# Patient Record
Sex: FEMALE | Race: OTHER | HISPANIC OR LATINO | URBAN - METROPOLITAN AREA
[De-identification: names, ages, dates, MRNs, and addresses within clinical notes are randomized per-mention and may not be internally consistent; named-entity substitution may affect disease eponyms.]

---

## 2017-08-12 ENCOUNTER — EMERGENCY (EMERGENCY)
Facility: HOSPITAL | Age: 47
LOS: 1 days | End: 2017-08-12
Payer: MEDICAID

## 2017-08-12 PROCEDURE — 99053 MED SERV 10PM-8AM 24 HR FAC: CPT

## 2017-08-12 PROCEDURE — 99284 EMERGENCY DEPT VISIT MOD MDM: CPT | Mod: 25

## 2017-08-13 PROCEDURE — 73560 X-RAY EXAM OF KNEE 1 OR 2: CPT | Mod: 26,LT

## 2017-08-13 PROCEDURE — 71010: CPT | Mod: 26

## 2017-11-26 ENCOUNTER — EMERGENCY (EMERGENCY)
Facility: HOSPITAL | Age: 47
LOS: 1 days | End: 2017-11-26
Payer: MEDICAID

## 2017-11-26 PROCEDURE — 99285 EMERGENCY DEPT VISIT HI MDM: CPT | Mod: 25

## 2017-11-27 PROCEDURE — 74177 CT ABD & PELVIS W/CONTRAST: CPT | Mod: 26

## 2019-01-14 ENCOUNTER — EMERGENCY (EMERGENCY)
Facility: HOSPITAL | Age: 49
LOS: 1 days | End: 2019-01-14
Payer: MEDICAID

## 2019-01-14 PROCEDURE — 71046 X-RAY EXAM CHEST 2 VIEWS: CPT | Mod: 26

## 2019-01-14 PROCEDURE — 99284 EMERGENCY DEPT VISIT MOD MDM: CPT

## 2019-12-12 ENCOUNTER — OUTPATIENT (OUTPATIENT)
Dept: OUTPATIENT SERVICES | Facility: HOSPITAL | Age: 49
LOS: 1 days | End: 2019-12-12

## 2019-12-12 ENCOUNTER — INPATIENT (INPATIENT)
Facility: HOSPITAL | Age: 49
LOS: 4 days | Discharge: HOME | End: 2019-12-17
Payer: MEDICAID

## 2019-12-12 PROCEDURE — 99285 EMERGENCY DEPT VISIT HI MDM: CPT

## 2019-12-12 PROCEDURE — 74177 CT ABD & PELVIS W/CONTRAST: CPT | Mod: 26

## 2019-12-13 ENCOUNTER — OUTPATIENT (OUTPATIENT)
Dept: OUTPATIENT SERVICES | Facility: HOSPITAL | Age: 49
LOS: 1 days | End: 2019-12-13

## 2019-12-14 ENCOUNTER — OUTPATIENT (OUTPATIENT)
Dept: OUTPATIENT SERVICES | Facility: HOSPITAL | Age: 49
LOS: 1 days | End: 2019-12-14

## 2019-12-15 ENCOUNTER — OUTPATIENT (OUTPATIENT)
Dept: OUTPATIENT SERVICES | Facility: HOSPITAL | Age: 49
LOS: 1 days | End: 2019-12-15

## 2019-12-16 ENCOUNTER — OUTPATIENT (OUTPATIENT)
Dept: OUTPATIENT SERVICES | Facility: HOSPITAL | Age: 49
LOS: 1 days | End: 2019-12-16

## 2019-12-17 ENCOUNTER — OUTPATIENT (OUTPATIENT)
Dept: OUTPATIENT SERVICES | Facility: HOSPITAL | Age: 49
LOS: 1 days | End: 2019-12-17

## 2020-04-29 ENCOUNTER — EMERGENCY (EMERGENCY)
Facility: HOSPITAL | Age: 50
LOS: 1 days | End: 2020-04-29
Admitting: EMERGENCY MEDICINE
Payer: MEDICAID

## 2020-04-29 PROCEDURE — 70450 CT HEAD/BRAIN W/O DYE: CPT | Mod: 26

## 2020-04-29 PROCEDURE — 99285 EMERGENCY DEPT VISIT HI MDM: CPT

## 2020-04-29 PROCEDURE — 71045 X-RAY EXAM CHEST 1 VIEW: CPT | Mod: 26

## 2020-07-09 ENCOUNTER — EMERGENCY (EMERGENCY)
Facility: HOSPITAL | Age: 50
LOS: 1 days | End: 2020-07-09
Admitting: EMERGENCY MEDICINE
Payer: MEDICAID

## 2020-07-09 PROCEDURE — 71045 X-RAY EXAM CHEST 1 VIEW: CPT | Mod: 26

## 2020-07-09 PROCEDURE — 99285 EMERGENCY DEPT VISIT HI MDM: CPT

## 2020-11-27 ENCOUNTER — EMERGENCY (EMERGENCY)
Facility: HOSPITAL | Age: 50
LOS: 1 days | End: 2020-11-27
Payer: MEDICAID

## 2020-11-27 ENCOUNTER — OUTPATIENT (OUTPATIENT)
Dept: OUTPATIENT SERVICES | Facility: HOSPITAL | Age: 50
LOS: 1 days | End: 2020-11-27

## 2020-11-27 PROCEDURE — 73562 X-RAY EXAM OF KNEE 3: CPT | Mod: 26,RT

## 2020-11-27 PROCEDURE — 73030 X-RAY EXAM OF SHOULDER: CPT | Mod: 26,LT

## 2020-11-27 PROCEDURE — 93010 ELECTROCARDIOGRAM REPORT: CPT

## 2020-11-27 PROCEDURE — 99284 EMERGENCY DEPT VISIT MOD MDM: CPT

## 2020-11-27 PROCEDURE — 71046 X-RAY EXAM CHEST 2 VIEWS: CPT | Mod: 26

## 2020-11-28 PROCEDURE — 93010 ELECTROCARDIOGRAM REPORT: CPT

## 2020-11-28 PROCEDURE — 93971 EXTREMITY STUDY: CPT | Mod: 26,LT

## 2020-11-28 PROCEDURE — 73221 MRI JOINT UPR EXTREM W/O DYE: CPT | Mod: 26,LT

## 2020-11-28 PROCEDURE — 99283 EMERGENCY DEPT VISIT LOW MDM: CPT

## 2020-11-29 ENCOUNTER — OUTPATIENT (OUTPATIENT)
Dept: OUTPATIENT SERVICES | Facility: HOSPITAL | Age: 50
LOS: 1 days | End: 2020-11-29

## 2020-11-30 ENCOUNTER — OUTPATIENT (OUTPATIENT)
Dept: OUTPATIENT SERVICES | Facility: HOSPITAL | Age: 50
LOS: 1 days | End: 2020-11-30

## 2020-11-30 PROBLEM — Z00.00 ENCOUNTER FOR PREVENTIVE HEALTH EXAMINATION: Status: ACTIVE | Noted: 2020-11-30

## 2021-01-11 ENCOUNTER — APPOINTMENT (OUTPATIENT)
Dept: RADIOLOGY | Facility: CLINIC | Age: 51
End: 2021-01-11
Payer: COMMERCIAL

## 2021-01-11 PROCEDURE — 71100 X-RAY EXAM RIBS UNI 2 VIEWS: CPT | Mod: RT

## 2021-01-11 PROCEDURE — 73010 X-RAY EXAM OF SHOULDER BLADE: CPT | Mod: RT

## 2023-05-01 ENCOUNTER — NON-APPOINTMENT (OUTPATIENT)
Age: 53
End: 2023-05-01

## 2023-05-01 ENCOUNTER — APPOINTMENT (OUTPATIENT)
Dept: COLORECTAL SURGERY | Facility: CLINIC | Age: 53
End: 2023-05-01
Payer: MEDICAID

## 2023-05-01 PROCEDURE — 99204 OFFICE O/P NEW MOD 45 MIN: CPT

## 2023-05-01 RX ORDER — NEOMYCIN SULFATE 500 MG/1
500 TABLET ORAL
Qty: 6 | Refills: 0 | Status: ACTIVE | COMMUNITY
Start: 2023-05-01 | End: 1900-01-01

## 2023-05-01 NOTE — ASSESSMENT
[FreeTextEntry1] : Ms. Avery presents to the office to discuss laparoscopic colostomy reversal after Lap Hartmanns in 1/5/23 for perf diverticulitis. She had a recent negative colonoscopy on 4/5/23. 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 Duration of procedure, length of hospital stay, period of convalescence including limitations of activity were all reviewed.   All questions were answered to her satisfaction. She will schedule for [facility] at end of June.\par

## 2023-05-01 NOTE — PHYSICAL EXAM
[No Rash or Lesion] : No rash or lesion [Alert] : alert [Oriented to Person] : oriented to person [Oriented to Place] : oriented to place [Oriented to Time] : oriented to time [Calm] : calm [de-identified] : colostomy with parastomal hernia present, soft, NT, no apparent distention [de-identified] : No apparent distress [de-identified] : Normocephalic atraumatic [de-identified] : Moving all extremities x4

## 2023-05-01 NOTE — HISTORY OF PRESENT ILLNESS
[FreeTextEntry1] : Ms. Avery presents to the office for consultation for colostomy reversal.  She underwent laparoscopic Zacarias's procedure for perforated diverticulitis on 1/5/2023 with Dr. Sharita Khan at [place].  On 4/5/2023, she underwent colonoscopy via colostomy without any concerning findings.  Unfortunately, [place] no longer honors her insurance, and she is now here to discuss colostomy reversal.

## 2023-05-01 NOTE — CONSULT LETTER
[Dear  ___] : Dear  [unfilled], [Consult Letter:] : I had the pleasure of evaluating your patient, [unfilled]. [Please see my note below.] : Please see my note below. [Consult Closing:] : Thank you very much for allowing me to participate in the care of this patient.  If you have any questions, please do not hesitate to contact me. [Sincerely,] : Sincerely, [FreeTextEntry3] : Anne Abernathy MD\par

## 2023-05-02 VITALS
HEART RATE: 114 BPM | WEIGHT: 175 LBS | SYSTOLIC BLOOD PRESSURE: 102 MMHG | HEIGHT: 60 IN | DIASTOLIC BLOOD PRESSURE: 70 MMHG | BODY MASS INDEX: 34.36 KG/M2

## 2023-06-06 ENCOUNTER — OUTPATIENT (OUTPATIENT)
Dept: OUTPATIENT SERVICES | Facility: HOSPITAL | Age: 53
LOS: 1 days | End: 2023-06-06
Payer: MEDICAID

## 2023-06-06 VITALS
RESPIRATION RATE: 17 BRPM | DIASTOLIC BLOOD PRESSURE: 70 MMHG | TEMPERATURE: 96 F | OXYGEN SATURATION: 98 % | HEART RATE: 91 BPM | SYSTOLIC BLOOD PRESSURE: 120 MMHG | HEIGHT: 55 IN | WEIGHT: 130.73 LBS

## 2023-06-06 DIAGNOSIS — Z13.89 ENCOUNTER FOR SCREENING FOR OTHER DISORDER: ICD-10-CM

## 2023-06-06 DIAGNOSIS — Z29.9 ENCOUNTER FOR PROPHYLACTIC MEASURES, UNSPECIFIED: ICD-10-CM

## 2023-06-06 DIAGNOSIS — Z98.890 OTHER SPECIFIED POSTPROCEDURAL STATES: Chronic | ICD-10-CM

## 2023-06-06 DIAGNOSIS — Z01.818 ENCOUNTER FOR OTHER PREPROCEDURAL EXAMINATION: ICD-10-CM

## 2023-06-06 DIAGNOSIS — I10 ESSENTIAL (PRIMARY) HYPERTENSION: ICD-10-CM

## 2023-06-06 DIAGNOSIS — Z43.3 ENCOUNTER FOR ATTENTION TO COLOSTOMY: ICD-10-CM

## 2023-06-06 DIAGNOSIS — E11.9 TYPE 2 DIABETES MELLITUS WITHOUT COMPLICATIONS: ICD-10-CM

## 2023-06-06 DIAGNOSIS — Z93.3 COLOSTOMY STATUS: Chronic | ICD-10-CM

## 2023-06-06 LAB
A1C WITH ESTIMATED AVERAGE GLUCOSE RESULT: 9.1 % — HIGH (ref 4–5.6)
ANION GAP SERPL CALC-SCNC: 14 MMOL/L — SIGNIFICANT CHANGE UP (ref 5–17)
APTT BLD: 30.1 SEC — SIGNIFICANT CHANGE UP (ref 27.5–35.5)
BASOPHILS # BLD AUTO: 0.04 K/UL — SIGNIFICANT CHANGE UP (ref 0–0.2)
BASOPHILS NFR BLD AUTO: 0.3 % — SIGNIFICANT CHANGE UP (ref 0–2)
BLD GP AB SCN SERPL QL: SIGNIFICANT CHANGE UP
BUN SERPL-MCNC: 8.6 MG/DL — SIGNIFICANT CHANGE UP (ref 8–20)
CALCIUM SERPL-MCNC: 9.3 MG/DL — SIGNIFICANT CHANGE UP (ref 8.4–10.5)
CHLORIDE SERPL-SCNC: 100 MMOL/L — SIGNIFICANT CHANGE UP (ref 96–108)
CO2 SERPL-SCNC: 24 MMOL/L — SIGNIFICANT CHANGE UP (ref 22–29)
CREAT SERPL-MCNC: 0.42 MG/DL — LOW (ref 0.5–1.3)
EGFR: 118 ML/MIN/1.73M2 — SIGNIFICANT CHANGE UP
EOSINOPHIL # BLD AUTO: 0.03 K/UL — SIGNIFICANT CHANGE UP (ref 0–0.5)
EOSINOPHIL NFR BLD AUTO: 0.2 % — SIGNIFICANT CHANGE UP (ref 0–6)
ESTIMATED AVERAGE GLUCOSE: 214 MG/DL — HIGH (ref 68–114)
GLUCOSE SERPL-MCNC: 229 MG/DL — HIGH (ref 70–99)
HCT VFR BLD CALC: 35.2 % — SIGNIFICANT CHANGE UP (ref 34.5–45)
HGB BLD-MCNC: 11.4 G/DL — LOW (ref 11.5–15.5)
IMM GRANULOCYTES NFR BLD AUTO: 0.8 % — SIGNIFICANT CHANGE UP (ref 0–0.9)
INR BLD: 1.02 RATIO — SIGNIFICANT CHANGE UP (ref 0.88–1.16)
LYMPHOCYTES # BLD AUTO: 23 % — SIGNIFICANT CHANGE UP (ref 13–44)
LYMPHOCYTES # BLD AUTO: 3.48 K/UL — HIGH (ref 1–3.3)
MCHC RBC-ENTMCNC: 28.9 PG — SIGNIFICANT CHANGE UP (ref 27–34)
MCHC RBC-ENTMCNC: 32.4 GM/DL — SIGNIFICANT CHANGE UP (ref 32–36)
MCV RBC AUTO: 89.3 FL — SIGNIFICANT CHANGE UP (ref 80–100)
MONOCYTES # BLD AUTO: 0.76 K/UL — SIGNIFICANT CHANGE UP (ref 0–0.9)
MONOCYTES NFR BLD AUTO: 5 % — SIGNIFICANT CHANGE UP (ref 2–14)
NEUTROPHILS # BLD AUTO: 10.72 K/UL — HIGH (ref 1.8–7.4)
NEUTROPHILS NFR BLD AUTO: 70.7 % — SIGNIFICANT CHANGE UP (ref 43–77)
PLATELET # BLD AUTO: 454 K/UL — HIGH (ref 150–400)
POTASSIUM SERPL-MCNC: 4 MMOL/L — SIGNIFICANT CHANGE UP (ref 3.5–5.3)
POTASSIUM SERPL-SCNC: 4 MMOL/L — SIGNIFICANT CHANGE UP (ref 3.5–5.3)
PROTHROM AB SERPL-ACNC: 11.8 SEC — SIGNIFICANT CHANGE UP (ref 10.5–13.4)
RBC # BLD: 3.94 M/UL — SIGNIFICANT CHANGE UP (ref 3.8–5.2)
RBC # FLD: 14 % — SIGNIFICANT CHANGE UP (ref 10.3–14.5)
SODIUM SERPL-SCNC: 138 MMOL/L — SIGNIFICANT CHANGE UP (ref 135–145)
WBC # BLD: 15.15 K/UL — HIGH (ref 3.8–10.5)
WBC # FLD AUTO: 15.15 K/UL — HIGH (ref 3.8–10.5)

## 2023-06-06 PROCEDURE — 93010 ELECTROCARDIOGRAM REPORT: CPT

## 2023-06-06 PROCEDURE — G0463: CPT

## 2023-06-06 PROCEDURE — 93005 ELECTROCARDIOGRAM TRACING: CPT

## 2023-06-06 NOTE — H&P PST ADULT - ASSESSMENT
53 y/o female with PMH of HLD, DM II, HTN, diverticulitis, anemia, IBS, fatty liver scheduled for laparoscopic reversal and related procedures on 2023      -Medical evaluation pending  - Patient educated on ERP protocol (written/verbal)- verbalized understanding  -Educated on NSAIDS, multivitamins and herbals that increase the risk of bleeding and need to be stopped 5 days before procedure  -Educated on infection prevention  -Tylenol can be taken 5 days before surgery if needed for pain  -Stop metformin the morning of surgery  -Will continue all other medications as prescribed  -Verbalized understanding of all instructions.    OPIOID RISK TOOL    SHARON EACH BOX THAT APPLIES AND ADD TOTALS AT THE END    FAMILY HISTORY OF SUBSTANCE ABUSE                 FEMALE         MALE                                                Alcohol                             [  ]1 pt          [  ]3pts                                               Illegal Durgs                     [  ]2 pts        [  ]3pts                                               Rx Drugs                           [  ]4 pts        [  ]4 pts    PERSONAL HISTORY OF SUBSTANCE ABUSE                                                                                          Alcohol                             [  ]3 pts       [  ]3 pts                                               Illegal Drugs                     [  ]4 pts        [  ]4 pts                                               Rx Drugs                           [  ]5 pts        [  ]5 pts    AGE BETWEEN 16-45 YEARS                                      [  ]1 pt         [  ]1 pt    HISTORY OF PREADOLESCENT   SEXUAL ABUSE                                                             [  ]3 pts        [  ]0pts    PSYCHOLOGICAL DISEASE                     ADD, OCD, Bipolar, Schizophrenia        [  ]2 pts         [  ]2 pts                      Depression                                               [  ]1 pt           [  ]1 pt           SCORING TOTAL   (add numbers and type here)              (0)                                     A score of 3 or lower indicated LOW risk for future opioid abuse  A score of 4 to 7 indicated moderate risk for future opioid abuse  A score of 8 or higher indicates a high risk for opioid abuse    CAPRINI SCORE [CLOT]    AGE RELATED RISK FACTORS                                                       MOBILITY RELATED FACTORS  [ x] Age 41-60 years                                            (1 Point)                  [ ] Bed rest                                                        (1 Point)  [ ] Age: 61-74 years                                           (2 Points)                 [ ] Plaster cast                                                   (2 Points)  [ ] Age= 75 years                                              (3 Points)                 [ ] Bed bound for more than 72 hours                 (2 Points)    DISEASE RELATED RISK FACTORS                                               GENDER SPECIFIC FACTORS  [ ] Edema in the lower extremities                       (1 Point)                  [ ] Pregnancy                                                     (1 Point)  [ ] Varicose veins                                               (1 Point)                  [ ] Post-partum < 6 weeks                                   (1 Point)             [x ] BMI > 25 Kg/m2                                            (1 Point)                  [ ] Hormonal therapy  or oral contraception          (1 Point)                 [ ] Sepsis (in the previous month)                        (1 Point)                  [ ] History of pregnancy complications                 (1 point)  [ ] Pneumonia or serious lung disease                                               [ ] Unexplained or recurrent                     (1 Point)           (in the previous month)                               (1 Point)  [ ] Abnormal pulmonary function test                     (1 Point)                 SURGERY RELATED RISK FACTORS  [ ] Acute myocardial infarction                              (1 Point)                 [ ]  Section                                             (1 Point)  [ ] Congestive heart failure (in the previous month)  (1 Point)               [ ] Minor surgery                                                  (1 Point)   [ ] Inflammatory bowel disease                             (1 Point)                 [ ] Arthroscopic surgery                                        (2 Points)  [ ] Central venous access                                      (2 Points)                [ x] General surgery lasting more than 45 minutes   (2 Points)       [ ] Stroke (in the previous month)                          (5 Points)               [ ] Elective arthroplasty                                         (5 Points)                                                                                                                                               HEMATOLOGY RELATED FACTORS                                                 TRAUMA RELATED RISK FACTORS  [ ] Prior episodes of VTE                                     (3 Points)                [ ] Fracture of the hip, pelvis, or leg                       (5 Points)  [ ] Positive family history for VTE                         (3 Points)                 [ ] Acute spinal cord injury (in the previous month)  (5 Points)  [ ] Prothrombin 44384 A                                     (3 Points)                 [ ] Paralysis  (less than 1 month)                             (5 Points)  [ ] Factor V Leiden                                             (3 Points)                  [ ] Multiple Trauma within 1 month                        (5 Points)  [ ] Lupus anticoagulants                                     (3 Points)                                                           [ ] Anticardiolipin antibodies                               (3 Points)                                                       [ ] High homocysteine in the blood                      (3 Points)                                             [ ] Other congenital or acquired thrombophilia      (3 Points)                                                [ ] Heparin induced thrombocytopenia                  (3 Points)                                          Total Score [        4  ]    Caprini Score 0 - 2:  Low Risk, No VTE Prophylaxis required for most patients, encourage ambulation  Caprini Score 3 - 6:  At Risk, pharmacologic VTE prophylaxis is indicated for most patients (in the absence of a contraindication)  Caprini Score Greater than or = 7:  High Risk, pharmacologic VTE prophylaxis is indicated for most patients (in the absence of a contraindication)

## 2023-06-06 NOTE — H&P PST ADULT - HISTORY OF PRESENT ILLNESS
Ms. Avery presents to the office for consultation for colostomy reversal. She underwent laparoscopic Zacarias's procedure for perforated diverticulitis on 1/5/2023 with Dr. Sharita Khan at [place]. On 4/5/2023, she underwent colonoscopy via colostomy without any concerning findings. Unfortunately, [place] no longer honors her insurance, and she is now here to discuss colostomy reversal.        Ms. Avery presents to the office to discuss laparoscopic colostomy reversal after Lap Hartmanns in 1/5/23 for perf diverticulitis. She had a recent negative colonoscopy on 4/5/23.          53 y/o female with PMH of DM II, HTN, diverticulitis, anemia, IBS, fatty liver presents to [facility] today. Pt. reports that 1/2023 she was having severe abdominal pain and then went to [facility].  She underwent laparoscopic Zacarias's procedure for perforated diverticulitis on 1/5/2023. On 4/5/2023, she underwent colonoscopy via colostomy without any concerning findings      presents to the office for consultation for colostomy reversal. She underwent laparoscopic Zacarias's procedure for perforated diverticulitis on 1/5/2023 with Dr. Sharita Khan at [place]. On 4/5/2023, she underwent colonoscopy via colostomy without any concerning findings. Unfortunately, [place] no longer honors her insurance, and she is now here to discuss colostomy reversal. Currently denies any N/V. Reports pain arount the colostomy site occasionally but denies any redness or site of infection. Stoma is normal draining stool.        Ms. Avery presents to the office to discuss laparoscopic colostomy reversal after Lap Hartmanns in 1/5/23 for perf diverticulitis. She had a recent negative colonoscopy on 4/5/23.          53 y/o female with PMH of HLD, DM II, HTN, diverticulitis, anemia, IBS, fatty liver presents to [facility] today. Pt. reports that 1/2023 she was having severe abdominal pain and then went to [facility].  She underwent laparoscopic Zacarias's procedure for perforated diverticulitis on 1/5/2023. On 4/5/2023, she underwent colonoscopy via colostomy without any concerning findings.  Currently denies any N/V. Reports pain arount the colostomy site occasionally but denies any redness or site of infection. Stoma is normal draining stool. Scheduled for laparoscopic reversal and related procedures on 6/26/2023

## 2023-06-06 NOTE — H&P PST ADULT - NSICDXFAMILYHX_GEN_ALL_CORE_FT
FAMILY HISTORY:  Mother  Still living? Yes, Estimated age: 71-80  FHx: diabetes mellitus, Age at diagnosis: Age Unknown    Sibling  Still living? No  Family history of cancer of eye, Age at diagnosis: Age Unknown

## 2023-06-06 NOTE — H&P PST ADULT - PROBLEM SELECTOR PLAN 1
51 y/o female with PMH of HLD, DM II, HTN, diverticulitis, anemia, IBS, fatty liver scheduled for laparoscopic reversal and related procedures on 6/26/2023      -Medical evaluation pending  - Patient educated on ERP protocol (written/verbal)- verbalized understanding  -Educated on NSAIDS, multivitamins and herbals that increase the risk of bleeding and need to be stopped 5 days before procedure  -Educated on infection prevention  -Tylenol can be taken 5 days before surgery if needed for pain  -Stop metformin the morning of surgery  -Will continue all other medications as prescribed  -Verbalized understanding of all instructions.

## 2023-06-06 NOTE — H&P PST ADULT - NSICDXPASTMEDICALHX_GEN_ALL_CORE_FT
PAST MEDICAL HISTORY:  At risk for sleep apnea     Diabetes mellitus     Diverticulitis     HLD (hyperlipidemia)     HTN (hypertension)

## 2023-06-12 PROBLEM — E11.9 TYPE 2 DIABETES MELLITUS WITHOUT COMPLICATIONS: Chronic | Status: ACTIVE | Noted: 2023-06-06

## 2023-06-12 PROBLEM — Z91.89 OTHER SPECIFIED PERSONAL RISK FACTORS, NOT ELSEWHERE CLASSIFIED: Chronic | Status: ACTIVE | Noted: 2023-06-06

## 2023-06-12 PROBLEM — E78.5 HYPERLIPIDEMIA, UNSPECIFIED: Chronic | Status: ACTIVE | Noted: 2023-06-06

## 2023-06-12 PROBLEM — K57.92 DIVERTICULITIS OF INTESTINE, PART UNSPECIFIED, WITHOUT PERFORATION OR ABSCESS WITHOUT BLEEDING: Chronic | Status: ACTIVE | Noted: 2023-06-06

## 2023-06-12 PROBLEM — I10 ESSENTIAL (PRIMARY) HYPERTENSION: Chronic | Status: ACTIVE | Noted: 2023-06-06

## 2023-06-19 RX ORDER — ERYTHROMYCIN 500 MG/1
500 TABLET, FILM COATED ORAL
Qty: 6 | Refills: 0 | Status: ACTIVE | COMMUNITY
Start: 2023-05-08 | End: 1900-01-01

## 2023-06-19 RX ORDER — METRONIDAZOLE 500 MG/1
500 TABLET ORAL
Qty: 3 | Refills: 0 | Status: ACTIVE | COMMUNITY
Start: 2023-05-01 | End: 1900-01-01

## 2023-08-07 ENCOUNTER — OUTPATIENT (OUTPATIENT)
Dept: OUTPATIENT SERVICES | Facility: HOSPITAL | Age: 53
LOS: 1 days | End: 2023-08-07
Payer: MEDICAID

## 2023-08-07 VITALS
SYSTOLIC BLOOD PRESSURE: 110 MMHG | WEIGHT: 130.73 LBS | HEIGHT: 60 IN | RESPIRATION RATE: 18 BRPM | TEMPERATURE: 97 F | DIASTOLIC BLOOD PRESSURE: 78 MMHG | HEART RATE: 98 BPM | OXYGEN SATURATION: 97 %

## 2023-08-07 DIAGNOSIS — E11.9 TYPE 2 DIABETES MELLITUS WITHOUT COMPLICATIONS: ICD-10-CM

## 2023-08-07 DIAGNOSIS — Z43.3 ENCOUNTER FOR ATTENTION TO COLOSTOMY: ICD-10-CM

## 2023-08-07 DIAGNOSIS — I10 ESSENTIAL (PRIMARY) HYPERTENSION: ICD-10-CM

## 2023-08-07 DIAGNOSIS — Z01.818 ENCOUNTER FOR OTHER PREPROCEDURAL EXAMINATION: ICD-10-CM

## 2023-08-07 DIAGNOSIS — Z93.3 COLOSTOMY STATUS: Chronic | ICD-10-CM

## 2023-08-07 DIAGNOSIS — Z98.890 OTHER SPECIFIED POSTPROCEDURAL STATES: Chronic | ICD-10-CM

## 2023-08-07 DIAGNOSIS — Z91.89 OTHER SPECIFIED PERSONAL RISK FACTORS, NOT ELSEWHERE CLASSIFIED: ICD-10-CM

## 2023-08-07 DIAGNOSIS — Z29.9 ENCOUNTER FOR PROPHYLACTIC MEASURES, UNSPECIFIED: ICD-10-CM

## 2023-08-07 LAB
A1C WITH ESTIMATED AVERAGE GLUCOSE RESULT: 9.3 % — HIGH (ref 4–5.6)
ALBUMIN SERPL ELPH-MCNC: 4.6 G/DL — SIGNIFICANT CHANGE UP (ref 3.3–5.2)
ALP SERPL-CCNC: 71 U/L — SIGNIFICANT CHANGE UP (ref 40–120)
ALT FLD-CCNC: 41 U/L — HIGH
ANION GAP SERPL CALC-SCNC: 15 MMOL/L — SIGNIFICANT CHANGE UP (ref 5–17)
APTT BLD: 35.1 SEC — SIGNIFICANT CHANGE UP (ref 24.5–35.6)
AST SERPL-CCNC: 56 U/L — HIGH
BASOPHILS # BLD AUTO: 0.02 K/UL — SIGNIFICANT CHANGE UP (ref 0–0.2)
BASOPHILS NFR BLD AUTO: 0.2 % — SIGNIFICANT CHANGE UP (ref 0–2)
BILIRUB SERPL-MCNC: 0.4 MG/DL — SIGNIFICANT CHANGE UP (ref 0.4–2)
BLD GP AB SCN SERPL QL: SIGNIFICANT CHANGE UP
BUN SERPL-MCNC: 7.3 MG/DL — LOW (ref 8–20)
CALCIUM SERPL-MCNC: 9.9 MG/DL — SIGNIFICANT CHANGE UP (ref 8.4–10.5)
CHLORIDE SERPL-SCNC: 100 MMOL/L — SIGNIFICANT CHANGE UP (ref 96–108)
CO2 SERPL-SCNC: 24 MMOL/L — SIGNIFICANT CHANGE UP (ref 22–29)
CREAT SERPL-MCNC: 0.52 MG/DL — SIGNIFICANT CHANGE UP (ref 0.5–1.3)
EGFR: 112 ML/MIN/1.73M2 — SIGNIFICANT CHANGE UP
EOSINOPHIL # BLD AUTO: 0.15 K/UL — SIGNIFICANT CHANGE UP (ref 0–0.5)
EOSINOPHIL NFR BLD AUTO: 1.7 % — SIGNIFICANT CHANGE UP (ref 0–6)
ESTIMATED AVERAGE GLUCOSE: 220 MG/DL — HIGH (ref 68–114)
GLUCOSE SERPL-MCNC: 143 MG/DL — HIGH (ref 70–99)
HCT VFR BLD CALC: 40.1 % — SIGNIFICANT CHANGE UP (ref 34.5–45)
HGB BLD-MCNC: 12.2 G/DL — SIGNIFICANT CHANGE UP (ref 11.5–15.5)
IMM GRANULOCYTES NFR BLD AUTO: 0.5 % — SIGNIFICANT CHANGE UP (ref 0–0.9)
INR BLD: 0.95 RATIO — SIGNIFICANT CHANGE UP (ref 0.85–1.18)
LYMPHOCYTES # BLD AUTO: 2.6 K/UL — SIGNIFICANT CHANGE UP (ref 1–3.3)
LYMPHOCYTES # BLD AUTO: 29.6 % — SIGNIFICANT CHANGE UP (ref 13–44)
MCHC RBC-ENTMCNC: 27.9 PG — SIGNIFICANT CHANGE UP (ref 27–34)
MCHC RBC-ENTMCNC: 30.4 GM/DL — LOW (ref 32–36)
MCV RBC AUTO: 91.8 FL — SIGNIFICANT CHANGE UP (ref 80–100)
MONOCYTES # BLD AUTO: 0.49 K/UL — SIGNIFICANT CHANGE UP (ref 0–0.9)
MONOCYTES NFR BLD AUTO: 5.6 % — SIGNIFICANT CHANGE UP (ref 2–14)
NEUTROPHILS # BLD AUTO: 5.47 K/UL — SIGNIFICANT CHANGE UP (ref 1.8–7.4)
NEUTROPHILS NFR BLD AUTO: 62.4 % — SIGNIFICANT CHANGE UP (ref 43–77)
PLATELET # BLD AUTO: 421 K/UL — HIGH (ref 150–400)
POTASSIUM SERPL-MCNC: 4.1 MMOL/L — SIGNIFICANT CHANGE UP (ref 3.5–5.3)
POTASSIUM SERPL-SCNC: 4.1 MMOL/L — SIGNIFICANT CHANGE UP (ref 3.5–5.3)
PROT SERPL-MCNC: 7.5 G/DL — SIGNIFICANT CHANGE UP (ref 6.6–8.7)
PROTHROM AB SERPL-ACNC: 10.6 SEC — SIGNIFICANT CHANGE UP (ref 9.5–13)
RBC # BLD: 4.37 M/UL — SIGNIFICANT CHANGE UP (ref 3.8–5.2)
RBC # FLD: 13.8 % — SIGNIFICANT CHANGE UP (ref 10.3–14.5)
SODIUM SERPL-SCNC: 139 MMOL/L — SIGNIFICANT CHANGE UP (ref 135–145)
WBC # BLD: 8.77 K/UL — SIGNIFICANT CHANGE UP (ref 3.8–10.5)
WBC # FLD AUTO: 8.77 K/UL — SIGNIFICANT CHANGE UP (ref 3.8–10.5)

## 2023-08-07 PROCEDURE — G0463: CPT

## 2023-08-07 PROCEDURE — 93010 ELECTROCARDIOGRAM REPORT: CPT

## 2023-08-07 PROCEDURE — 93005 ELECTROCARDIOGRAM TRACING: CPT

## 2023-08-07 RX ORDER — LISINOPRIL 2.5 MG/1
1 TABLET ORAL
Refills: 0 | DISCHARGE

## 2023-08-07 RX ORDER — PANTOPRAZOLE SODIUM 20 MG/1
1 TABLET, DELAYED RELEASE ORAL
Refills: 0 | DISCHARGE

## 2023-08-07 RX ORDER — ROSUVASTATIN CALCIUM 5 MG/1
1 TABLET ORAL
Refills: 0 | DISCHARGE

## 2023-08-07 RX ORDER — METFORMIN HYDROCHLORIDE 850 MG/1
1 TABLET ORAL
Refills: 0 | DISCHARGE

## 2023-08-07 NOTE — H&P PST ADULT - MUSCULOSKELETAL
normal/ROM intact/no joint swelling/no joint erythema/no joint warmth/no calf tenderness/normal gait/strength 5/5 bilateral upper extremities/strength 5/5 bilateral lower extremities/no chest wall tenderness details…

## 2023-08-07 NOTE — H&P PST ADULT - PROBLEM SELECTOR PLAN 4
Medical optimization pending for laparoscopic reversal and related procedures secondary to encounter for attention to colostomy with Dr. Anne Abernathy on 8/28/23.

## 2023-08-07 NOTE — H&P PST ADULT - ASSESSMENT
51 y/o female presents today to PST pending laparoscopic reversal and related procedures secondary to encounter for attention to colostomy with Dr. Anne Abernathy on 23. History of type 2 DM, HLD, GERD, and HTN. Pt. states she is with a colostomy that is being removed. Pt. states her intestines burst inside, states 1/3/23. History of fatty liver in the past. Denies abdominal pain, denies n/v/d/c. Denies blood in stool. Pt. states she had this surgery scheduled previously but it was cancelled due to elevated A1C, pt. is on metformin. Denies medication adjustment of metformin. Pt. states she has had diabetes for > 10 years, almost 15 years. Per chart A1C 9.1 on 23. States she is trying to get appointment with Endocrinologist, she has not yet been seen. \[facility] [place] [facility]\"" surgeon's office referred her to the PCP she is pending medical optimization with.     Pt. to have medical clearance with PCP Dr. Goldberg, pt. states she has appt. scheduled, pt. verbalized agreement and understanding.   Pt. educated and instructed regarding all preoperative instructions and education as per policy via both verbal and written means of communication and pt. verbalized agreement and understanding.  Patient educated on surgical scrub, preadmission instructions, medical clearance and day of procedure medications, verbalizes understanding and agreement.   Pt instructed to stop vitamins/supplements/herbal medications/NSAIDS for one week prior to surgery and pt. verbalized agreement and understanding.  Liquids before surgery as per surgeon and pt. verbalized agreement and understanding.  Pt. advised to discuss preoperative metformin hold with PCP and pt. verbalized agreement and understanding.   Pt. advised to call PST with phone number and name of PCP and pt. agreed.    OPIOID RISK TOOL    SHARON EACH BOX THAT APPLIES AND ADD TOTALS AT THE END    FAMILY HISTORY OF SUBSTANCE ABUSE                 FEMALE         MALE                                                Alcohol                             [  ]1 pt          [  ]3pts                                               Illegal Durgs                     [  ]2 pts        [  ]3pts                                               Rx Drugs                           [  ]4 pts        [  ]4 pts    PERSONAL HISTORY OF SUBSTANCE ABUSE                                                                                          Alcohol                             [  ]3 pts       [  ]3 pts                                               Illegal Drugs                     [  ]4 pts        [  ]4 pts                                               Rx Drugs                           [  ]5 pts        [  ]5 pts    AGE BETWEEN 16-45 YEARS                                      [  ]1 pt         [  ]1 pt    HISTORY OF PREADOLESCENT   SEXUAL ABUSE                                                             [  ]3 pts        [  ]0pts    PSYCHOLOGICAL DISEASE                     ADD, OCD, Bipolar, Schizophrenia        [  ]2 pts         [  ]2 pts                      Depression                                               [  ]1 pt           [  ]1 pt           SCORING TOTAL   (add numbers and type here)              (0)                                     A score of 3 or lower indicated LOW risk for future opioid abuse  A score of 4 to 7 indicated moderate risk for future opioid abuse  A score of 8 or higher indicates a high risk for opioid abuse    CAPRINI SCORE    AGE RELATED RISK FACTORS                                                             [x ] Age 41-60 years                                            (1 Point)  [ ] Age: 61-74 years                                           (2 Points)                 [ ] Age= 75 years                                                (3 Points)             DISEASE RELATED RISK FACTORS                                                       [ ] Edema in the lower extremities                 (1 Point)                     [ ] Varicose veins                                               (1 Point)                                 [ x] BMI > 25 Kg/m2                                            (1 Point)                                  [ ] Serious infection (ie PNA)                            (1 Point)                     [ ] Lung disease ( COPD, Emphysema)            (1 Point)                                                                          [ ] Acute myocardial infarction                         (1 Point)                  [ ] Congestive heart failure (in the previous month)  (1 Point)         [ ] Inflammatory bowel disease                            (1 Point)                  [ ] Central venous access, PICC or Port               (2 points)       (within the last month)                                                                [ ] Stroke (in the previous month)                        (5 Points)    [ ] Previous or present malignancy                       (2 points)                                                                                                                                                         HEMATOLOGY RELATED FACTORS                                                         [ ] Prior episodes of VTE                                     (3 Points)                     [ ] Positive family history for VTE                      (3 Points)                  [ ] Prothrombin 50299 A                                     (3 Points)                     [ ] Factor V Leiden                                                (3 Points)                        [ ] Lupus anticoagulants                                      (3 Points)                                                           [ ] Anticardiolipin antibodies                              (3 Points)                                                       [ ] High homocysteine in the blood                   (3 Points)                                             [ ] Other congenital or acquired thrombophilia      (3 Points)                                                [ ] Heparin induced thrombocytopenia                  (3 Points)                                        MOBILITY RELATED FACTORS  [ ] Bed rest                                                         (1 Point)  [ ] Plaster cast                                                    (2 points)  [ ] Bed bound for more than 72 hours           (2 Points)    GENDER SPECIFIC FACTORS  [ ] Pregnancy or had a baby within the last month   (1 Point)  [ ] Post-partum < 6 weeks                                   (1 Point)  [ ] Hormonal therapy  or oral contraception   (1 Point)  [ ] History of pregnancy complications              (1 point)  [ ] Unexplained or recurrent              (1 Point)    OTHER RISK FACTORS                                           (1 Point)  [ ] BMI >40, smoking, diabetes requiring insulin, chemotherapy  blood transfusions and length of surgery over 2 hours    SURGERY RELATED RISK FACTORS  [ ]  Section within the last month     (1 Point)  [ ] Minor surgery                                                  (1 Point)  [ ] Arthroscopic surgery                                       (2 Points)  [ x] Planned major surgery lasting more            (2 Points)      than 45 minutes     [ ] Elective hip or knee joint replacement       (5 points)       surgery                                                TRAUMA RELATED RISK FACTORS  [ ] Fracture of the hip, pelvis, or leg                       (5 Points)  [ ] Spinal cord injury resulting in paralysis             (5 points)       (in the previous month)    [ ] Paralysis  (less than 1 month)                             (5 Points)  [ ] Multiple Trauma within 1 month                        (5 Points)    Total Score [  4      ]    Caprini Score 0-2: Low Risk, NO VTE prophylaxis required for most patients, encourage ambulation  Caprini Score 3-6: Moderate Risk , pharmacologic VTE prophylaxis is indicated for most patients (in the absence of contraindications)  Caprini Score Greater than or =7: High risk, pharmocologic VTE prophylaxis indicated for most patients (in the absence of contraindications)                               53 y/o female presents today to PST pending laparoscopic reversal and related procedures secondary to encounter for attention to colostomy with Dr. Anne Abernathy on 23. History of type 2 DM, HLD, GERD, and HTN. Pt. states she is with a colostomy that is being removed. Pt. states her intestines burst inside, states 1/3/23. History of fatty liver in the past. Denies abdominal pain, denies n/v/d/c. Denies blood in stool. Pt. states she had this surgery scheduled previously but it was cancelled due to elevated A1C, pt. is on metformin. Denies medication adjustment of metformin. Pt. states she has had diabetes for > 10 years, almost 15 years. Per chart A1C 9.1 on 23. States she is trying to get appointment with Endocrinologist, she has not yet been seen. \[facility] [place] [facility]\"" surgeon's office referred her to the PCP she is pending medical optimization with.     Pt. to have medical clearance with PCP Dr. Goldberg, pt. states she has appt. scheduled, pt. verbalized agreement and understanding.   Pt. educated and instructed regarding all preoperative instructions and education as per policy via both verbal and written means of communication and pt. verbalized agreement and understanding.  Patient educated on surgical scrub, preadmission instructions, medical clearance and day of procedure medications, verbalizes understanding and agreement.   Pt instructed to stop vitamins/supplements/herbal medications/NSAIDS for one week prior to surgery and pt. verbalized agreement and understanding.  Liquids before surgery as per surgeon and pt. verbalized agreement and understanding.  Pt. advised to discuss preoperative metformin hold with PCP and pt. verbalized agreement and understanding.   Pt. advised to call PST with phone number and name of PCP and pt. agreed.  Pt. expressed concern over receiving ostomy supplies in time. Advised pt. to f/u with PCP re community resources to obtain supplies up until surgery. Pt. verbalized agreement and understanding.     OPIOID RISK TOOL    SHARON EACH BOX THAT APPLIES AND ADD TOTALS AT THE END    FAMILY HISTORY OF SUBSTANCE ABUSE                 FEMALE         MALE                                                Alcohol                             [  ]1 pt          [  ]3pts                                               Illegal Durgs                     [  ]2 pts        [  ]3pts                                               Rx Drugs                           [  ]4 pts        [  ]4 pts    PERSONAL HISTORY OF SUBSTANCE ABUSE                                                                                          Alcohol                             [  ]3 pts       [  ]3 pts                                               Illegal Drugs                     [  ]4 pts        [  ]4 pts                                               Rx Drugs                           [  ]5 pts        [  ]5 pts    AGE BETWEEN 16-45 YEARS                                      [  ]1 pt         [  ]1 pt    HISTORY OF PREADOLESCENT   SEXUAL ABUSE                                                             [  ]3 pts        [  ]0pts    PSYCHOLOGICAL DISEASE                     ADD, OCD, Bipolar, Schizophrenia        [  ]2 pts         [  ]2 pts                      Depression                                               [  ]1 pt           [  ]1 pt           SCORING TOTAL   (add numbers and type here)              (0)                                     A score of 3 or lower indicated LOW risk for future opioid abuse  A score of 4 to 7 indicated moderate risk for future opioid abuse  A score of 8 or higher indicates a high risk for opioid abuse    CAPRINI SCORE    AGE RELATED RISK FACTORS                                                             [x ] Age 41-60 years                                            (1 Point)  [ ] Age: 61-74 years                                           (2 Points)                 [ ] Age= 75 years                                                (3 Points)             DISEASE RELATED RISK FACTORS                                                       [ ] Edema in the lower extremities                 (1 Point)                     [ ] Varicose veins                                               (1 Point)                                 [ x] BMI > 25 Kg/m2                                            (1 Point)                                  [ ] Serious infection (ie PNA)                            (1 Point)                     [ ] Lung disease ( COPD, Emphysema)            (1 Point)                                                                          [ ] Acute myocardial infarction                         (1 Point)                  [ ] Congestive heart failure (in the previous month)  (1 Point)         [ ] Inflammatory bowel disease                            (1 Point)                  [ ] Central venous access, PICC or Port               (2 points)       (within the last month)                                                                [ ] Stroke (in the previous month)                        (5 Points)    [ ] Previous or present malignancy                       (2 points)                                                                                                                                                         HEMATOLOGY RELATED FACTORS                                                         [ ] Prior episodes of VTE                                     (3 Points)                     [ ] Positive family history for VTE                      (3 Points)                  [ ] Prothrombin 32841 A                                     (3 Points)                     [ ] Factor V Leiden                                                (3 Points)                        [ ] Lupus anticoagulants                                      (3 Points)                                                           [ ] Anticardiolipin antibodies                              (3 Points)                                                       [ ] High homocysteine in the blood                   (3 Points)                                             [ ] Other congenital or acquired thrombophilia      (3 Points)                                                [ ] Heparin induced thrombocytopenia                  (3 Points)                                        MOBILITY RELATED FACTORS  [ ] Bed rest                                                         (1 Point)  [ ] Plaster cast                                                    (2 points)  [ ] Bed bound for more than 72 hours           (2 Points)    GENDER SPECIFIC FACTORS  [ ] Pregnancy or had a baby within the last month   (1 Point)  [ ] Post-partum < 6 weeks                                   (1 Point)  [ ] Hormonal therapy  or oral contraception   (1 Point)  [ ] History of pregnancy complications              (1 point)  [ ] Unexplained or recurrent              (1 Point)    OTHER RISK FACTORS                                           (1 Point)  [ ] BMI >40, smoking, diabetes requiring insulin, chemotherapy  blood transfusions and length of surgery over 2 hours    SURGERY RELATED RISK FACTORS  [ ]  Section within the last month     (1 Point)  [ ] Minor surgery                                                  (1 Point)  [ ] Arthroscopic surgery                                       (2 Points)  [ x] Planned major surgery lasting more            (2 Points)      than 45 minutes     [ ] Elective hip or knee joint replacement       (5 points)       surgery                                                TRAUMA RELATED RISK FACTORS  [ ] Fracture of the hip, pelvis, or leg                       (5 Points)  [ ] Spinal cord injury resulting in paralysis             (5 points)       (in the previous month)    [ ] Paralysis  (less than 1 month)                             (5 Points)  [ ] Multiple Trauma within 1 month                        (5 Points)    Total Score [  4      ]    Caprini Score 0-2: Low Risk, NO VTE prophylaxis required for most patients, encourage ambulation  Caprini Score 3-6: Moderate Risk , pharmacologic VTE prophylaxis is indicated for most patients (in the absence of contraindications)  Caprini Score Greater than or =7: High risk, pharmocologic VTE prophylaxis indicated for most patients (in the absence of contraindications)

## 2023-08-07 NOTE — H&P PST ADULT - GASTROINTESTINAL
normal/soft/nontender/nondistended/normal active bowel sounds/no guarding/no rigidity/no organomegaly/no palpable freddy/no masses palpable details…

## 2023-08-07 NOTE — H&P PST ADULT - HISTORY OF PRESENT ILLNESS
51 y/o female presents today to PST pending laparoscopic reversal and related procedures secondary to encounter for attention to colostomy with Dr. Anne Abernathy on 8/28/23. History of type 2 DM, HLD, GERD, and HTN. Pt. states she is with a colostomy that is being removed. Pt. states her intestines burst inside, states 1/3/23. History of fatty liver in the past. Denies abdominal pain, denies n/v/d/c. Denies blood in stool. Pt. states she had this surgery scheduled previously but it was cancelled due to elevated A1C, pt. is on metformin. Denies medication adjustment of metformin. Pt. states she has had diabetes for > 10 years, almost 15 years. Per chart A1C 9.1 on 6/6/23. States she is trying to get appointment with Endocrinologist, she has not yet been seen. [facility] surgeon's office referred her to the PCP she is pending medical optimization with.

## 2023-08-07 NOTE — H&P PST ADULT - NEUROLOGICAL
details… normal/cranial nerves II-XII intact/sensation intact/responds to pain/responds to verbal commands/cranial nerves intact/no spontaneous movement/superficial reflexes intact

## 2023-08-07 NOTE — H&P PST ADULT - NEGATIVE MUSCULOSKELETAL SYMPTOMS
no arthralgia/no joint swelling/no myalgia/no muscle cramps/no muscle weakness/no stiffness/no neck pain/no back pain

## 2023-08-07 NOTE — H&P PST ADULT - NSHP PST DIAGOTHER LIST_GEN_A_CORE
8/7/23 19:03 All available labs noted as documented. All abnormal labs noted as documented and have been faxed to PCP with whom medical optimization is pending. CBC and A1C emailed to surgeon. Jessica MARIA, FNP-BC

## 2023-08-07 NOTE — H&P PST ADULT - NSICDXPASTMEDICALHX_GEN_ALL_CORE_FT
PAST MEDICAL HISTORY:  At risk for sleep apnea     Diabetes mellitus     Diverticulitis     Encounter for attention to colostomy     GERD (gastroesophageal reflux disease)     HLD (hyperlipidemia)     HTN (hypertension)

## 2023-08-07 NOTE — H&P PST ADULT - NEGATIVE NEUROLOGICAL SYMPTOMS
no transient paralysis/no weakness/no generalized seizures/no focal seizures/no syncope/no tremors/no vertigo/no loss of sensation/no difficulty walking/no loss of consciousness/no hemiparesis/no confusion/no facial palsy

## 2023-08-07 NOTE — H&P PST ADULT - CARDIOVASCULAR
normal/regular rate and rhythm/S1 S2 present/no gallops/no rub/no murmur/no JVD/normal PMI/no pedal edema negative

## 2023-08-07 NOTE — H&P PST ADULT - SKIN COMMENTS
right abdomen multiple erythematous raised pustules nontender to touch, no s&s infection, no exudate, no bleeding, no edema. right abdomen multiple erythematous raised pustules nontender to touch, no s&s infection, no exudate, no bleeding, no edema. Pt. advised to f/u with PCP and she agreed.

## 2023-08-07 NOTE — H&P PST ADULT - SKIN/BREAST COMMENTS
3 days ago right lower abdomen hives/rash, itching and burning, confined to this area. PCP f/u advised and pt. agreed.

## 2023-08-14 ENCOUNTER — APPOINTMENT (OUTPATIENT)
Dept: INTERNAL MEDICINE | Facility: CLINIC | Age: 53
End: 2023-08-14

## 2023-08-15 ENCOUNTER — NON-APPOINTMENT (OUTPATIENT)
Age: 53
End: 2023-08-15

## 2023-08-28 ENCOUNTER — APPOINTMENT (OUTPATIENT)
Dept: COLORECTAL SURGERY | Facility: HOSPITAL | Age: 53
End: 2023-08-28

## 2023-09-17 NOTE — H&P PST ADULT - INTERPRETER'S NAME
Pt present to ED via private auto with c/o cough and nasal congestion, onset two days ago. Pt states he was around his son who is also sick with a cough. Pt afebrile tachy and monitor. Even, non-labored breathing.  Pt able to ambulate without assist.      Jada Larson RN  09/17/23 0134 [address]

## 2024-03-20 PROBLEM — Z43.3 ENCOUNTER FOR ATTENTION TO COLOSTOMY: Chronic | Status: ACTIVE | Noted: 2023-08-07

## 2024-03-20 PROBLEM — K21.9 GASTRO-ESOPHAGEAL REFLUX DISEASE WITHOUT ESOPHAGITIS: Chronic | Status: ACTIVE | Noted: 2023-08-07

## 2024-04-02 ENCOUNTER — APPOINTMENT (OUTPATIENT)
Dept: COLORECTAL SURGERY | Facility: CLINIC | Age: 54
End: 2024-04-02
Payer: COMMERCIAL

## 2024-04-02 VITALS
RESPIRATION RATE: 14 BRPM | DIASTOLIC BLOOD PRESSURE: 72 MMHG | SYSTOLIC BLOOD PRESSURE: 125 MMHG | TEMPERATURE: 97.3 F | HEART RATE: 105 BPM | OXYGEN SATURATION: 98 %

## 2024-04-02 DIAGNOSIS — Z43.3 ENCOUNTER FOR ATTENTION TO COLOSTOMY: ICD-10-CM

## 2024-04-02 DIAGNOSIS — Z87.19 PERSONAL HISTORY OF OTHER DISEASES OF THE DIGESTIVE SYSTEM: ICD-10-CM

## 2024-04-02 DIAGNOSIS — Z86.39 PERSONAL HISTORY OF OTHER ENDOCRINE, NUTRITIONAL AND METABOLIC DISEASE: ICD-10-CM

## 2024-04-02 PROCEDURE — 99214 OFFICE O/P EST MOD 30 MIN: CPT

## 2024-04-02 RX ORDER — METFORMIN HYDROCHLORIDE 625 MG/1
TABLET ORAL
Refills: 0 | Status: ACTIVE | COMMUNITY

## 2024-04-02 RX ORDER — GLIMEPIRIDE 4 MG/1
TABLET ORAL
Refills: 0 | Status: ACTIVE | COMMUNITY

## 2024-04-02 RX ORDER — LISINOPRIL 30 MG/1
TABLET ORAL
Refills: 0 | Status: ACTIVE | COMMUNITY

## 2024-04-02 RX ORDER — ROSUVASTATIN CALCIUM 5 MG/1
TABLET, FILM COATED ORAL
Refills: 0 | Status: ACTIVE | COMMUNITY

## 2024-04-02 NOTE — HISTORY OF PRESENT ILLNESS
[FreeTextEntry1] : Ms. Avery presents to the office for consultation for colostomy reversal.  She underwent laparoscopic Zacarias's procedure for perforated diverticulitis on 1/5/2023 with Dr. Sharita Khan at [facility].  On 4/5/2023, she underwent colonoscopy via colostomy without any concerning findings.  Unfortunately, [facility] no longer honors her insurance, and she is now here to discuss colostomy reversal.  4/2/24 Ms. Avery presents to the office for followup. Since her last office appt, she has been unable to undergo colostomy reversal bc of elevated Hgba1C. Most recent numbers are 7.6 from 11/21/23 and 8.4 from 2/29/24.

## 2024-04-02 NOTE — PHYSICAL EXAM
Labor note/Event Note [No Rash or Lesion] : No rash or lesion [Alert] : alert [Oriented to Person] : oriented to person [Oriented to Time] : oriented to time [Oriented to Place] : oriented to place [Calm] : calm [de-identified] : No apparent distress [de-identified] : colostomy with parastomal hernia present, soft, NT, no apparent distention [de-identified] : Normocephalic atraumatic [de-identified] : Moving all extremities x4

## 2024-04-02 NOTE — ASSESSMENT
[FreeTextEntry1] : Ms. Avery presents to the office to discuss laparoscopic colostomy reversal after Lap Hartmanns in 1/5/23 for perf diverticulitis. She had a recent negative colonoscopy on 4/5/23.  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 Duration of procedure, length of hospital stay, period of convalescence including limitations of activity were all reviewed.   All questions were answered to her satisfaction. She will schedule for SS at end of June.  4/2/24 Ms. Avery returns to the office for followup. Since her last office appt, she continues to have issues with poorly controlled DM as reflected by increasing HgbA1c. Most recent value is 8.4 from 2/29/24.  Advised patient that blood sugar control is mandatory before elective surgery as poorly controlled diabetes is a risk factor for wound infection, dehiscence and breakdown.  She was advised on the acceptable A1c numbers including less than 6.0.  She understands that her metformin is likely ineffective and will try to see consultation with an endocrinologist.  Of note, prior attempts by this office to help her establish care with endocrine were otherwise futile as patient was not compliant with office visits.

## 2025-01-21 ENCOUNTER — APPOINTMENT (OUTPATIENT)
Dept: COLORECTAL SURGERY | Facility: CLINIC | Age: 55
End: 2025-01-21
Payer: COMMERCIAL

## 2025-01-21 DIAGNOSIS — Z43.3 ENCOUNTER FOR ATTENTION TO COLOSTOMY: ICD-10-CM

## 2025-01-21 PROCEDURE — 99215 OFFICE O/P EST HI 40 MIN: CPT

## 2025-01-24 RX ORDER — METRONIDAZOLE 500 MG/1
500 TABLET ORAL
Qty: 3 | Refills: 0 | Status: ACTIVE | COMMUNITY
Start: 2025-01-21 | End: 1900-01-01

## 2025-01-24 RX ORDER — NEOMYCIN SULFATE 500 MG/1
500 TABLET ORAL
Qty: 6 | Refills: 0 | Status: ACTIVE | COMMUNITY
Start: 2025-01-21 | End: 1900-01-01

## 2025-03-08 ENCOUNTER — OUTPATIENT (OUTPATIENT)
Dept: OUTPATIENT SERVICES | Facility: HOSPITAL | Age: 55
LOS: 1 days | End: 2025-03-08
Payer: COMMERCIAL

## 2025-03-08 VITALS
OXYGEN SATURATION: 98 % | DIASTOLIC BLOOD PRESSURE: 62 MMHG | HEIGHT: 55 IN | WEIGHT: 118.61 LBS | TEMPERATURE: 97 F | SYSTOLIC BLOOD PRESSURE: 102 MMHG | HEART RATE: 102 BPM | RESPIRATION RATE: 14 BRPM

## 2025-03-08 DIAGNOSIS — Z43.3 ENCOUNTER FOR ATTENTION TO COLOSTOMY: ICD-10-CM

## 2025-03-08 DIAGNOSIS — Z98.890 OTHER SPECIFIED POSTPROCEDURAL STATES: Chronic | ICD-10-CM

## 2025-03-08 DIAGNOSIS — Z01.818 ENCOUNTER FOR OTHER PREPROCEDURAL EXAMINATION: ICD-10-CM

## 2025-03-08 DIAGNOSIS — K21.9 GASTRO-ESOPHAGEAL REFLUX DISEASE WITHOUT ESOPHAGITIS: ICD-10-CM

## 2025-03-08 DIAGNOSIS — E03.9 HYPOTHYROIDISM, UNSPECIFIED: ICD-10-CM

## 2025-03-08 DIAGNOSIS — I10 ESSENTIAL (PRIMARY) HYPERTENSION: ICD-10-CM

## 2025-03-08 DIAGNOSIS — Z91.89 OTHER SPECIFIED PERSONAL RISK FACTORS, NOT ELSEWHERE CLASSIFIED: ICD-10-CM

## 2025-03-08 DIAGNOSIS — Z29.9 ENCOUNTER FOR PROPHYLACTIC MEASURES, UNSPECIFIED: ICD-10-CM

## 2025-03-08 DIAGNOSIS — E11.9 TYPE 2 DIABETES MELLITUS WITHOUT COMPLICATIONS: ICD-10-CM

## 2025-03-08 DIAGNOSIS — Z93.3 COLOSTOMY STATUS: Chronic | ICD-10-CM

## 2025-03-08 LAB
A1C WITH ESTIMATED AVERAGE GLUCOSE RESULT: 6.4 % — HIGH (ref 4–5.6)
ALBUMIN SERPL ELPH-MCNC: 4.4 G/DL — SIGNIFICANT CHANGE UP (ref 3.3–5.2)
ALP SERPL-CCNC: 123 U/L — HIGH (ref 40–120)
ALT FLD-CCNC: 46 U/L — HIGH
ANION GAP SERPL CALC-SCNC: 14 MMOL/L — SIGNIFICANT CHANGE UP (ref 5–17)
APTT BLD: 39.1 SEC — HIGH (ref 24.5–35.6)
AST SERPL-CCNC: 52 U/L — HIGH
BASOPHILS # BLD AUTO: 0.03 K/UL — SIGNIFICANT CHANGE UP (ref 0–0.2)
BASOPHILS NFR BLD AUTO: 0.4 % — SIGNIFICANT CHANGE UP (ref 0–2)
BILIRUB SERPL-MCNC: 0.3 MG/DL — LOW (ref 0.4–2)
BLD GP AB SCN SERPL QL: SIGNIFICANT CHANGE UP
BUN SERPL-MCNC: 9 MG/DL — SIGNIFICANT CHANGE UP (ref 8–20)
CALCIUM SERPL-MCNC: 9.2 MG/DL — SIGNIFICANT CHANGE UP (ref 8.4–10.5)
CHLORIDE SERPL-SCNC: 103 MMOL/L — SIGNIFICANT CHANGE UP (ref 96–108)
CO2 SERPL-SCNC: 22 MMOL/L — SIGNIFICANT CHANGE UP (ref 22–29)
CREAT SERPL-MCNC: 0.43 MG/DL — LOW (ref 0.5–1.3)
EGFR: 116 ML/MIN/1.73M2 — SIGNIFICANT CHANGE UP
EGFR: 116 ML/MIN/1.73M2 — SIGNIFICANT CHANGE UP
EOSINOPHIL # BLD AUTO: 0.17 K/UL — SIGNIFICANT CHANGE UP (ref 0–0.5)
EOSINOPHIL NFR BLD AUTO: 2.3 % — SIGNIFICANT CHANGE UP (ref 0–6)
ESTIMATED AVERAGE GLUCOSE: 137 MG/DL — HIGH (ref 68–114)
GLUCOSE SERPL-MCNC: 63 MG/DL — LOW (ref 70–99)
HCT VFR BLD CALC: 40.3 % — SIGNIFICANT CHANGE UP (ref 34.5–45)
HGB BLD-MCNC: 12.4 G/DL — SIGNIFICANT CHANGE UP (ref 11.5–15.5)
IMM GRANULOCYTES # BLD AUTO: 0.02 K/UL — SIGNIFICANT CHANGE UP (ref 0–0.07)
IMM GRANULOCYTES NFR BLD AUTO: 0.3 % — SIGNIFICANT CHANGE UP (ref 0–0.9)
INR BLD: 0.99 RATIO — SIGNIFICANT CHANGE UP (ref 0.85–1.16)
LYMPHOCYTES # BLD AUTO: 2.72 K/UL — SIGNIFICANT CHANGE UP (ref 1–3.3)
LYMPHOCYTES NFR BLD AUTO: 36.3 % — SIGNIFICANT CHANGE UP (ref 13–44)
MCHC RBC-ENTMCNC: 27.7 PG — SIGNIFICANT CHANGE UP (ref 27–34)
MCHC RBC-ENTMCNC: 30.8 G/DL — LOW (ref 32–36)
MCV RBC AUTO: 90.2 FL — SIGNIFICANT CHANGE UP (ref 80–100)
MONOCYTES # BLD AUTO: 0.44 K/UL — SIGNIFICANT CHANGE UP (ref 0–0.9)
MONOCYTES NFR BLD AUTO: 5.9 % — SIGNIFICANT CHANGE UP (ref 2–14)
NEUTROPHILS # BLD AUTO: 4.11 K/UL — SIGNIFICANT CHANGE UP (ref 1.8–7.4)
NEUTROPHILS NFR BLD AUTO: 54.8 % — SIGNIFICANT CHANGE UP (ref 43–77)
NRBC # BLD AUTO: 0 K/UL — SIGNIFICANT CHANGE UP (ref 0–0)
NRBC # FLD: 0 K/UL — SIGNIFICANT CHANGE UP (ref 0–0)
NRBC BLD AUTO-RTO: 0 /100 WBCS — SIGNIFICANT CHANGE UP (ref 0–0)
PLATELET # BLD AUTO: 372 K/UL — SIGNIFICANT CHANGE UP (ref 150–400)
PMV BLD: 9.3 FL — SIGNIFICANT CHANGE UP (ref 7–13)
POTASSIUM SERPL-MCNC: 3.9 MMOL/L — SIGNIFICANT CHANGE UP (ref 3.5–5.3)
POTASSIUM SERPL-SCNC: 3.9 MMOL/L — SIGNIFICANT CHANGE UP (ref 3.5–5.3)
PROT SERPL-MCNC: 7.5 G/DL — SIGNIFICANT CHANGE UP (ref 6.6–8.7)
PROTHROM AB SERPL-ACNC: 11.2 SEC — SIGNIFICANT CHANGE UP (ref 9.9–13.4)
RBC # BLD: 4.47 M/UL — SIGNIFICANT CHANGE UP (ref 3.8–5.2)
RBC # FLD: 13 % — SIGNIFICANT CHANGE UP (ref 10.3–14.5)
SODIUM SERPL-SCNC: 139 MMOL/L — SIGNIFICANT CHANGE UP (ref 135–145)
T3 SERPL-MCNC: 102 NG/DL — SIGNIFICANT CHANGE UP (ref 80–200)
T4 AB SER-ACNC: 10.1 UG/DL — SIGNIFICANT CHANGE UP (ref 4.5–12)
TSH SERPL-MCNC: 2.69 UIU/ML — SIGNIFICANT CHANGE UP (ref 0.27–4.2)
WBC # BLD: 7.49 K/UL — SIGNIFICANT CHANGE UP (ref 3.8–10.5)
WBC # FLD AUTO: 7.49 K/UL — SIGNIFICANT CHANGE UP (ref 3.8–10.5)

## 2025-03-08 PROCEDURE — 36415 COLL VENOUS BLD VENIPUNCTURE: CPT

## 2025-03-08 PROCEDURE — 93010 ELECTROCARDIOGRAM REPORT: CPT

## 2025-03-08 PROCEDURE — G0463: CPT

## 2025-03-08 PROCEDURE — 83036 HEMOGLOBIN GLYCOSYLATED A1C: CPT

## 2025-03-08 PROCEDURE — 84480 ASSAY TRIIODOTHYRONINE (T3): CPT

## 2025-03-08 PROCEDURE — T1013: CPT

## 2025-03-08 PROCEDURE — 85025 COMPLETE CBC W/AUTO DIFF WBC: CPT

## 2025-03-08 PROCEDURE — 86900 BLOOD TYPING SEROLOGIC ABO: CPT

## 2025-03-08 PROCEDURE — 86901 BLOOD TYPING SEROLOGIC RH(D): CPT

## 2025-03-08 PROCEDURE — 84443 ASSAY THYROID STIM HORMONE: CPT

## 2025-03-08 PROCEDURE — 80053 COMPREHEN METABOLIC PANEL: CPT

## 2025-03-08 PROCEDURE — 93005 ELECTROCARDIOGRAM TRACING: CPT

## 2025-03-08 PROCEDURE — 85730 THROMBOPLASTIN TIME PARTIAL: CPT

## 2025-03-08 PROCEDURE — 84436 ASSAY OF TOTAL THYROXINE: CPT

## 2025-03-08 PROCEDURE — 85610 PROTHROMBIN TIME: CPT

## 2025-03-08 PROCEDURE — 86850 RBC ANTIBODY SCREEN: CPT

## 2025-03-24 ENCOUNTER — APPOINTMENT (OUTPATIENT)
Dept: COLORECTAL SURGERY | Facility: HOSPITAL | Age: 55
End: 2025-03-24

## 2025-03-24 ENCOUNTER — INPATIENT (INPATIENT)
Facility: HOSPITAL | Age: 55
LOS: 2 days | Discharge: HOME | DRG: 395 | End: 2025-03-27
Attending: COLON & RECTAL SURGERY | Admitting: COLON & RECTAL SURGERY
Payer: COMMERCIAL

## 2025-03-24 ENCOUNTER — RESULT REVIEW (OUTPATIENT)
Age: 55
End: 2025-03-24

## 2025-03-24 ENCOUNTER — TRANSCRIPTION ENCOUNTER (OUTPATIENT)
Age: 55
End: 2025-03-24

## 2025-03-24 VITALS
HEART RATE: 96 BPM | HEIGHT: 55 IN | SYSTOLIC BLOOD PRESSURE: 98 MMHG | TEMPERATURE: 97 F | OXYGEN SATURATION: 99 % | WEIGHT: 118.61 LBS | RESPIRATION RATE: 16 BRPM | DIASTOLIC BLOOD PRESSURE: 83 MMHG

## 2025-03-24 DIAGNOSIS — Z93.3 COLOSTOMY STATUS: Chronic | ICD-10-CM

## 2025-03-24 DIAGNOSIS — Z98.890 OTHER SPECIFIED POSTPROCEDURAL STATES: Chronic | ICD-10-CM

## 2025-03-24 DIAGNOSIS — Z43.3 ENCOUNTER FOR ATTENTION TO COLOSTOMY: ICD-10-CM

## 2025-03-24 LAB
APTT BLD: 39.1 SEC — HIGH (ref 24.5–35.6)
BLD GP AB SCN SERPL QL: SIGNIFICANT CHANGE UP
GLUCOSE BLDC GLUCOMTR-MCNC: 134 MG/DL — HIGH (ref 70–99)
GLUCOSE BLDC GLUCOMTR-MCNC: 150 MG/DL — HIGH (ref 70–99)
GLUCOSE BLDC GLUCOMTR-MCNC: 94 MG/DL — SIGNIFICANT CHANGE UP (ref 70–99)
INR BLD: 1.21 RATIO — HIGH (ref 0.85–1.16)
PROTHROM AB SERPL-ACNC: 13.6 SEC — HIGH (ref 9.9–13.4)

## 2025-03-24 PROCEDURE — 44626 REPAIR BOWEL OPENING: CPT | Mod: AS

## 2025-03-24 PROCEDURE — 44626 REPAIR BOWEL OPENING: CPT

## 2025-03-24 PROCEDURE — 88304 TISSUE EXAM BY PATHOLOGIST: CPT | Mod: 26

## 2025-03-24 PROCEDURE — S2900 ROBOTIC SURGICAL SYSTEM: CPT | Mod: NC

## 2025-03-24 PROCEDURE — 88307 TISSUE EXAM BY PATHOLOGIST: CPT | Mod: 26

## 2025-03-24 PROCEDURE — 45300 PROCTOSIGMOIDOSCOPY DX: CPT | Mod: 59

## 2025-03-24 RX ORDER — GLUCAGON 3 MG/1
1 POWDER NASAL ONCE
Refills: 0 | Status: DISCONTINUED | OUTPATIENT
Start: 2025-03-24 | End: 2025-03-27

## 2025-03-24 RX ORDER — DEXTROSE 50 % IN WATER 50 %
15 SYRINGE (ML) INTRAVENOUS ONCE
Refills: 0 | Status: DISCONTINUED | OUTPATIENT
Start: 2025-03-24 | End: 2025-03-27

## 2025-03-24 RX ORDER — ROSUVASTATIN CALCIUM 20 MG/1
1 TABLET, FILM COATED ORAL
Refills: 0 | DISCHARGE

## 2025-03-24 RX ORDER — DAPAGLIFLOZIN AND METFORMIN HYDROCHLORIDE 2.5; 1 MG/1; MG/1
1 TABLET, FILM COATED, EXTENDED RELEASE ORAL
Refills: 0 | DISCHARGE

## 2025-03-24 RX ORDER — HEPARIN SODIUM 1000 [USP'U]/ML
5000 INJECTION INTRAVENOUS; SUBCUTANEOUS EVERY 8 HOURS
Refills: 0 | Status: DISCONTINUED | OUTPATIENT
Start: 2025-03-24 | End: 2025-03-27

## 2025-03-24 RX ORDER — INSULIN LISPRO 100 U/ML
INJECTION, SOLUTION INTRAVENOUS; SUBCUTANEOUS
Refills: 0 | Status: DISCONTINUED | OUTPATIENT
Start: 2025-03-24 | End: 2025-03-27

## 2025-03-24 RX ORDER — INSULIN LISPRO 100 U/ML
INJECTION, SOLUTION INTRAVENOUS; SUBCUTANEOUS AT BEDTIME
Refills: 0 | Status: DISCONTINUED | OUTPATIENT
Start: 2025-03-24 | End: 2025-03-27

## 2025-03-24 RX ORDER — ORAL SEMAGLUTIDE 14 MG/1
0.5 TABLET ORAL
Refills: 0 | DISCHARGE

## 2025-03-24 RX ORDER — LISINOPRIL 5 MG/1
5 TABLET ORAL DAILY
Refills: 0 | Status: DISCONTINUED | OUTPATIENT
Start: 2025-03-24 | End: 2025-03-27

## 2025-03-24 RX ORDER — ONDANSETRON HCL/PF 4 MG/2 ML
4 VIAL (ML) INJECTION ONCE
Refills: 0 | Status: DISCONTINUED | OUTPATIENT
Start: 2025-03-24 | End: 2025-03-24

## 2025-03-24 RX ORDER — CEFOTETAN DISODIUM 1 G
2 VIAL (EA) INJECTION ONCE
Refills: 0 | Status: DISCONTINUED | OUTPATIENT
Start: 2025-03-24 | End: 2025-03-25

## 2025-03-24 RX ORDER — LEVOTHYROXINE SODIUM 300 MCG
50 TABLET ORAL DAILY
Refills: 0 | Status: DISCONTINUED | OUTPATIENT
Start: 2025-03-24 | End: 2025-03-27

## 2025-03-24 RX ORDER — SODIUM CHLORIDE 9 G/1000ML
1000 INJECTION, SOLUTION INTRAVENOUS
Refills: 0 | Status: DISCONTINUED | OUTPATIENT
Start: 2025-03-24 | End: 2025-03-27

## 2025-03-24 RX ORDER — IBUPROFEN 200 MG
1 TABLET ORAL
Refills: 0 | DISCHARGE

## 2025-03-24 RX ORDER — GLIMEPIRIDE 4 MG/1
1 TABLET ORAL
Refills: 0 | DISCHARGE

## 2025-03-24 RX ORDER — FENTANYL CITRATE-0.9 % NACL/PF 100MCG/2ML
25 SYRINGE (ML) INTRAVENOUS
Refills: 0 | Status: DISCONTINUED | OUTPATIENT
Start: 2025-03-24 | End: 2025-03-24

## 2025-03-24 RX ORDER — ROSUVASTATIN CALCIUM 5 MG/1
20 TABLET, FILM COATED ORAL AT BEDTIME
Refills: 0 | Status: DISCONTINUED | OUTPATIENT
Start: 2025-03-24 | End: 2025-03-27

## 2025-03-24 RX ORDER — DEXTROSE 50 % IN WATER 50 %
25 SYRINGE (ML) INTRAVENOUS ONCE
Refills: 0 | Status: DISCONTINUED | OUTPATIENT
Start: 2025-03-24 | End: 2025-03-27

## 2025-03-24 RX ORDER — DEXTROSE 50 % IN WATER 50 %
12.5 SYRINGE (ML) INTRAVENOUS ONCE
Refills: 0 | Status: DISCONTINUED | OUTPATIENT
Start: 2025-03-24 | End: 2025-03-27

## 2025-03-24 RX ORDER — HYDROMORPHONE/SOD CHLOR,ISO/PF 2 MG/10 ML
0.5 SYRINGE (ML) INJECTION
Refills: 0 | Status: DISCONTINUED | OUTPATIENT
Start: 2025-03-24 | End: 2025-03-24

## 2025-03-24 RX ADMIN — Medication 0.5 MILLIGRAM(S): at 22:32

## 2025-03-24 RX ADMIN — Medication 0.5 MILLIGRAM(S): at 22:23

## 2025-03-24 NOTE — ASU PREOP CHECKLIST - SITE MARKED BY SURGEON
n/a Bactrim Pregnancy And Lactation Text: This medication is Pregnancy Category D and is known to cause fetal risk.  It is also excreted in breast milk.

## 2025-03-24 NOTE — BRIEF OPERATIVE NOTE - OPERATION/FINDINGS
61 yr M with  ETOH Abuse, HTN, CKD III and gout presented to ER for tremors    # Alcohol Intoxication  - started on CIWA protocol with Ativan IV taper on admission	  - stopped LR @ 75 today   - Seizure and fall precaution  - Addiction meds consult  - no withdrawal symptoms today, Ativan dc'd    #HAGMA due to alcohol intoxication  - stopped IVF today   - lactate 7.3 on admission, 1.3 today     #HTN   - controlled  - c/w Norvasc  - HTN overnight and this AM, likely d/t IVF  - stopped and will monitor  - dc on Amlodipine 10 if still elevated    #urinary retention  - Likely due to BPH  - c/w Flomax    #Chronic Thrombocytopenia  - At baseline, likely from alcohol induce BM suppression VS cirrhosis    #elevated LFTs and bili: suspected underlying alcoholic cirrhosis VS hepatic steatohepatitis  - RUQ: Diffuse hepatic steatosis. Gallbladder sludge and punctate cholelithiasis  - /AST 63/ALT 27  - INR 1.2  - LFTs stable  - alcohol cessation encouraged   - f/u with PCP in 2 weeks     # CKD3: GFR at baseline    #Suspected folate and thiamine def  - c/w Thiamine and Folate supplements     #Hx of Gout/ acute flare  - c/w prednisone 40mg (day 2/5)     Patient is hemodynamically stable, not exhibiting symptoms of withdrawal, is feeling well enough to be discharged, and is medically clear for discharge.
RA colostomy reversal. prior ostomy site take down medium cookie placed and robotic ports placed under direct visualization. JOSHUA performed.  proximal colon transected with purse string applier. Double layer closure around anvil. Rectal stump identified and transected with sureform. CR anastomosis performed using 28 EEA. leak test performed no leak, and proctosigmoidoscopy performed. 12mm robotic site closed lap with 0 vicryl. Fascia at prior osotomy site closed with PDS, ostomy site closure via purse string technique and packing

## 2025-03-24 NOTE — BRIEF OPERATIVE NOTE - NSICDXBRIEFPROCEDURE_GEN_ALL_CORE_FT
PROCEDURES:  Revision, colostomy, robot-assisted, laparoscopic, using da Clark Xi 24-Mar-2025 21:08:28  Tracy Avery

## 2025-03-25 LAB
ANION GAP SERPL CALC-SCNC: 16 MMOL/L — SIGNIFICANT CHANGE UP (ref 5–17)
BUN SERPL-MCNC: 6.7 MG/DL — LOW (ref 8–20)
CALCIUM SERPL-MCNC: 8.4 MG/DL — SIGNIFICANT CHANGE UP (ref 8.4–10.5)
CHLORIDE SERPL-SCNC: 103 MMOL/L — SIGNIFICANT CHANGE UP (ref 96–108)
CO2 SERPL-SCNC: 21 MMOL/L — LOW (ref 22–29)
CREAT SERPL-MCNC: 0.47 MG/DL — LOW (ref 0.5–1.3)
EGFR: 113 ML/MIN/1.73M2 — SIGNIFICANT CHANGE UP
EGFR: 113 ML/MIN/1.73M2 — SIGNIFICANT CHANGE UP
GLUCOSE BLDC GLUCOMTR-MCNC: 107 MG/DL — HIGH (ref 70–99)
GLUCOSE BLDC GLUCOMTR-MCNC: 136 MG/DL — HIGH (ref 70–99)
GLUCOSE BLDC GLUCOMTR-MCNC: 140 MG/DL — HIGH (ref 70–99)
GLUCOSE BLDC GLUCOMTR-MCNC: 94 MG/DL — SIGNIFICANT CHANGE UP (ref 70–99)
GLUCOSE SERPL-MCNC: 155 MG/DL — HIGH (ref 70–99)
HCT VFR BLD CALC: 36.9 % — SIGNIFICANT CHANGE UP (ref 34.5–45)
HGB BLD-MCNC: 11.8 G/DL — SIGNIFICANT CHANGE UP (ref 11.5–15.5)
MAGNESIUM SERPL-MCNC: 1.5 MG/DL — LOW (ref 1.6–2.6)
MCHC RBC-ENTMCNC: 28.7 PG — SIGNIFICANT CHANGE UP (ref 27–34)
MCHC RBC-ENTMCNC: 32 G/DL — SIGNIFICANT CHANGE UP (ref 32–36)
MCV RBC AUTO: 89.8 FL — SIGNIFICANT CHANGE UP (ref 80–100)
NRBC # BLD AUTO: 0 K/UL — SIGNIFICANT CHANGE UP (ref 0–0)
NRBC # FLD: 0 K/UL — SIGNIFICANT CHANGE UP (ref 0–0)
NRBC BLD AUTO-RTO: 0 /100 WBCS — SIGNIFICANT CHANGE UP (ref 0–0)
PHOSPHATE SERPL-MCNC: 4.3 MG/DL — SIGNIFICANT CHANGE UP (ref 2.4–4.7)
PLATELET # BLD AUTO: 355 K/UL — SIGNIFICANT CHANGE UP (ref 150–400)
PMV BLD: 9.2 FL — SIGNIFICANT CHANGE UP (ref 7–13)
POTASSIUM SERPL-MCNC: 3.7 MMOL/L — SIGNIFICANT CHANGE UP (ref 3.5–5.3)
POTASSIUM SERPL-SCNC: 3.7 MMOL/L — SIGNIFICANT CHANGE UP (ref 3.5–5.3)
RBC # BLD: 4.11 M/UL — SIGNIFICANT CHANGE UP (ref 3.8–5.2)
RBC # FLD: 13.5 % — SIGNIFICANT CHANGE UP (ref 10.3–14.5)
SODIUM SERPL-SCNC: 139 MMOL/L — SIGNIFICANT CHANGE UP (ref 135–145)
WBC # BLD: 10.74 K/UL — HIGH (ref 3.8–10.5)
WBC # FLD AUTO: 10.74 K/UL — HIGH (ref 3.8–10.5)

## 2025-03-25 PROCEDURE — 99231 SBSQ HOSP IP/OBS SF/LOW 25: CPT | Mod: GC,24

## 2025-03-25 RX ORDER — KETOROLAC TROMETHAMINE 30 MG/ML
15 INJECTION, SOLUTION INTRAMUSCULAR; INTRAVENOUS EVERY 6 HOURS
Refills: 0 | Status: DISCONTINUED | OUTPATIENT
Start: 2025-03-25 | End: 2025-03-27

## 2025-03-25 RX ORDER — KETOROLAC TROMETHAMINE 30 MG/ML
15 INJECTION, SOLUTION INTRAMUSCULAR; INTRAVENOUS ONCE
Refills: 0 | Status: DISCONTINUED | OUTPATIENT
Start: 2025-03-25 | End: 2025-03-25

## 2025-03-25 RX ORDER — SODIUM CHLORIDE 9 G/1000ML
1000 INJECTION, SOLUTION INTRAVENOUS
Refills: 0 | Status: DISCONTINUED | OUTPATIENT
Start: 2025-03-25 | End: 2025-03-26

## 2025-03-25 RX ORDER — KETOROLAC TROMETHAMINE 30 MG/ML
15 INJECTION, SOLUTION INTRAMUSCULAR; INTRAVENOUS EVERY 6 HOURS
Refills: 0 | Status: DISCONTINUED | OUTPATIENT
Start: 2025-03-25 | End: 2025-03-25

## 2025-03-25 RX ORDER — SIMETHICONE 80 MG
80 TABLET,CHEWABLE ORAL
Refills: 0 | Status: DISCONTINUED | OUTPATIENT
Start: 2025-03-25 | End: 2025-03-27

## 2025-03-25 RX ORDER — MAGNESIUM SULFATE 500 MG/ML
2 SYRINGE (ML) INJECTION ONCE
Refills: 0 | Status: COMPLETED | OUTPATIENT
Start: 2025-03-25 | End: 2025-03-25

## 2025-03-25 RX ADMIN — Medication 2 MILLIGRAM(S): at 05:30

## 2025-03-25 RX ADMIN — Medication 50 MICROGRAM(S): at 04:12

## 2025-03-25 RX ADMIN — Medication 40 MILLIEQUIVALENT(S): at 10:41

## 2025-03-25 RX ADMIN — Medication 2 MILLIGRAM(S): at 10:41

## 2025-03-25 RX ADMIN — Medication 2 MILLIGRAM(S): at 18:15

## 2025-03-25 RX ADMIN — Medication 2 MILLIGRAM(S): at 17:15

## 2025-03-25 RX ADMIN — LISINOPRIL 5 MILLIGRAM(S): 5 TABLET ORAL at 05:48

## 2025-03-25 RX ADMIN — KETOROLAC TROMETHAMINE 15 MILLIGRAM(S): 30 INJECTION, SOLUTION INTRAMUSCULAR; INTRAVENOUS at 12:31

## 2025-03-25 RX ADMIN — KETOROLAC TROMETHAMINE 15 MILLIGRAM(S): 30 INJECTION, SOLUTION INTRAMUSCULAR; INTRAVENOUS at 02:08

## 2025-03-25 RX ADMIN — KETOROLAC TROMETHAMINE 15 MILLIGRAM(S): 30 INJECTION, SOLUTION INTRAMUSCULAR; INTRAVENOUS at 17:15

## 2025-03-25 RX ADMIN — KETOROLAC TROMETHAMINE 15 MILLIGRAM(S): 30 INJECTION, SOLUTION INTRAMUSCULAR; INTRAVENOUS at 23:21

## 2025-03-25 RX ADMIN — KETOROLAC TROMETHAMINE 15 MILLIGRAM(S): 30 INJECTION, SOLUTION INTRAMUSCULAR; INTRAVENOUS at 18:15

## 2025-03-25 RX ADMIN — Medication 50 MILLIEQUIVALENT(S): at 09:19

## 2025-03-25 RX ADMIN — HEPARIN SODIUM 5000 UNIT(S): 1000 INJECTION INTRAVENOUS; SUBCUTANEOUS at 14:48

## 2025-03-25 RX ADMIN — SODIUM CHLORIDE 75 MILLILITER(S): 9 INJECTION, SOLUTION INTRAVENOUS at 23:23

## 2025-03-25 RX ADMIN — HEPARIN SODIUM 5000 UNIT(S): 1000 INJECTION INTRAVENOUS; SUBCUTANEOUS at 21:12

## 2025-03-25 RX ADMIN — KETOROLAC TROMETHAMINE 15 MILLIGRAM(S): 30 INJECTION, SOLUTION INTRAMUSCULAR; INTRAVENOUS at 11:31

## 2025-03-25 RX ADMIN — ROSUVASTATIN CALCIUM 20 MILLIGRAM(S): 5 TABLET, FILM COATED ORAL at 21:12

## 2025-03-25 RX ADMIN — Medication 2 MILLIGRAM(S): at 23:21

## 2025-03-25 RX ADMIN — Medication 2 MILLIGRAM(S): at 11:41

## 2025-03-25 RX ADMIN — Medication 2 MILLIGRAM(S): at 04:33

## 2025-03-25 RX ADMIN — KETOROLAC TROMETHAMINE 15 MILLIGRAM(S): 30 INJECTION, SOLUTION INTRAMUSCULAR; INTRAVENOUS at 03:00

## 2025-03-25 RX ADMIN — HEPARIN SODIUM 5000 UNIT(S): 1000 INJECTION INTRAVENOUS; SUBCUTANEOUS at 04:12

## 2025-03-25 RX ADMIN — Medication 80 MILLIGRAM(S): at 22:13

## 2025-03-25 NOTE — CHART NOTE - NSCHARTNOTEFT_GEN_A_CORE
POST-OP NOTE    TELLY AVERY | [id number] | [facility] 2BRK 2405 01    Procedure: s/p RA colostomy reversal    Subjective:  Ms Avery is doing well post-operatively, but reports pain at her surgical site.  She has multiple allergies to pain medicines, including a rash when taking acetaminophen.  She reports taking ibuprofen and morphine in the past without issue.  Denies nausea or vomiting.  She has been tolerating her CLD, but is only drinking a small amount due to a fear of nausea.  No ROBF yet.    Vital Signs Last 24 Hrs  T(C): 36.9 (24 Mar 2025 21:40), Max: 36.9 (24 Mar 2025 21:40)  T(F): 98.5 (24 Mar 2025 21:40), Max: 98.5 (24 Mar 2025 21:40)  HR: 115 (24 Mar 2025 23:30) (96 - 115)  BP: 112/74 (24 Mar 2025 23:30) (98/83 - 120/77)  BP(mean): 86 (24 Mar 2025 23:30) (84 - 91)  RR: 18 (24 Mar 2025 23:30) (12 - 18)  SpO2: 97% (24 Mar 2025 23:30) (95% - 99%)    Parameters below as of 24 Mar 2025 23:30  Patient On (Oxygen Delivery Method): nasal cannula  O2 Flow (L/min): 2    I&O's Summary           PT/INR - ( 24 Mar 2025 15:49 )   PT: 13.6 sec;   INR: 1.21 ratio         PTT - ( 24 Mar 2025 15:49 )  PTT:39.1 sec    PHYSICAL EXAM:  Gen: NAD  Resp: breathing easily, no stridor  CV: RRR  Abdomen: soft, appropriately tender, nondistended  Skin: Incision c/d/i. Normal color, no rashes or lesions      A: 54F POD 0 s/p RA colostomy reversal.  Doing well postoperatively but in pain with a hx of reactions to multiple pain medications.    P:  - Will use ibuprofen and morphine as Pt has tolerated those medicines in the past  - Continue CLD, will assess ability to advance diet in the AM  - Encourage IS  - Smyth out when ambulating  - Will monitor for reactions to pain medicines POST-OP NOTE    TELLY AVERY | [id number] | [facility] 2BRK 2405 01    Procedure: s/p RA colostomy reversal    Subjective:  Ms Avery is doing well post-operatively, but reports pain at her surgical site.  She has multiple allergies to pain medicines, including a rash when taking acetaminophen.  She reports taking ibuprofen and morphine in the past without issue.  She does not believe she has had an issue with ketorolac in the past.  Denies nausea or vomiting.  She has been tolerating her CLD, but is only drinking a small amount due to a fear of nausea.  No ROBF yet.    Vital Signs Last 24 Hrs  T(C): 36.9 (24 Mar 2025 21:40), Max: 36.9 (24 Mar 2025 21:40)  T(F): 98.5 (24 Mar 2025 21:40), Max: 98.5 (24 Mar 2025 21:40)  HR: 115 (24 Mar 2025 23:30) (96 - 115)  BP: 112/74 (24 Mar 2025 23:30) (98/83 - 120/77)  BP(mean): 86 (24 Mar 2025 23:30) (84 - 91)  RR: 18 (24 Mar 2025 23:30) (12 - 18)  SpO2: 97% (24 Mar 2025 23:30) (95% - 99%)    Parameters below as of 24 Mar 2025 23:30  Patient On (Oxygen Delivery Method): nasal cannula  O2 Flow (L/min): 2    I&O's Summary           PT/INR - ( 24 Mar 2025 15:49 )   PT: 13.6 sec;   INR: 1.21 ratio         PTT - ( 24 Mar 2025 15:49 )  PTT:39.1 sec    PHYSICAL EXAM:  Gen: NAD  Resp: breathing easily, no stridor  CV: RRR  Abdomen: soft, appropriately tender, nondistended  Skin: Incision c/d/i. Normal color, no rashes or lesions      A: 54F POD 0 s/p RA colostomy reversal.  Doing well postoperatively but in pain with a hx of reactions to multiple pain medications.    P:  - Will use ibuprofen and morphine as Pt has tolerated those medicines in the past.  - Continue CLD, will assess ability to advance diet in the AM  - Encourage IS  - Smyth out when ambulating  - Will monitor for reactions to pain medicines POST-OP NOTE    TELLY AVERY | [id number] | [facility] 2BRK 2405 01    Procedure: s/p RA colostomy reversal    Subjective:  Ms Avery is doing well post-operatively, but reports pain at her surgical site.  She has multiple allergies to pain medicines, including a rash when taking acetaminophen.  She reports taking ibuprofen and morphine in the past without issue.  She does not believe she has had an issue with ketorolac in the past.  Denies nausea or vomiting.  She has been tolerating her CLD, but is only drinking a small amount due to a fear of nausea.  No ROBF yet.    Vital Signs Last 24 Hrs  T(C): 36.9 (24 Mar 2025 21:40), Max: 36.9 (24 Mar 2025 21:40)  T(F): 98.5 (24 Mar 2025 21:40), Max: 98.5 (24 Mar 2025 21:40)  HR: 115 (24 Mar 2025 23:30) (96 - 115)  BP: 112/74 (24 Mar 2025 23:30) (98/83 - 120/77)  BP(mean): 86 (24 Mar 2025 23:30) (84 - 91)  RR: 18 (24 Mar 2025 23:30) (12 - 18)  SpO2: 97% (24 Mar 2025 23:30) (95% - 99%)    Parameters below as of 24 Mar 2025 23:30  Patient On (Oxygen Delivery Method): nasal cannula  O2 Flow (L/min): 2    I&O's Summary           PT/INR - ( 24 Mar 2025 15:49 )   PT: 13.6 sec;   INR: 1.21 ratio         PTT - ( 24 Mar 2025 15:49 )  PTT:39.1 sec    PHYSICAL EXAM:  Gen: NAD  Resp: breathing easily, no stridor  CV: RRR  Abdomen: soft, appropriately tender, nondistended  Skin: Incision c/d/i. Normal color, no rashes or lesions      A: 54F POD 0 s/p RA colostomy reversal.  Doing well postoperatively but in pain with a hx of reactions to multiple pain medications.    P:  - Discussed pain medication options with pharmacy, will attempt one time dose of ketorolac and monitor her response, as it is unclear if Ms. Avery has a true reaction to ibuprofen/advil.  Will additionally order morphine which she has tolerated in the past without issue, however we would like to avoid opioids if possible after colon surgery  - Continue CLD, will assess ability to advance diet in the AM  - Encourage IS  - Smyth out when ambulating  - Will monitor for reactions to pain medicines

## 2025-03-25 NOTE — PATIENT PROFILE ADULT - FALL HARM RISK - HARM RISK INTERVENTIONS

## 2025-03-25 NOTE — PHYSICAL THERAPY INITIAL EVALUATION ADULT - ADDITIONAL COMMENTS
Pt lives in a house  with 0 steps to enter and  0 stairs inside.   Pt owns medical equipment: none   Pt lives with: spouse and brother in law   Someone is available to provide assist.

## 2025-03-26 LAB
ANION GAP SERPL CALC-SCNC: 14 MMOL/L — SIGNIFICANT CHANGE UP (ref 5–17)
BUN SERPL-MCNC: 6.7 MG/DL — LOW (ref 8–20)
CALCIUM SERPL-MCNC: 8.1 MG/DL — LOW (ref 8.4–10.5)
CHLORIDE SERPL-SCNC: 104 MMOL/L — SIGNIFICANT CHANGE UP (ref 96–108)
CO2 SERPL-SCNC: 21 MMOL/L — LOW (ref 22–29)
CREAT SERPL-MCNC: 0.41 MG/DL — LOW (ref 0.5–1.3)
EGFR: 117 ML/MIN/1.73M2 — SIGNIFICANT CHANGE UP
EGFR: 117 ML/MIN/1.73M2 — SIGNIFICANT CHANGE UP
GLUCOSE BLDC GLUCOMTR-MCNC: 110 MG/DL — HIGH (ref 70–99)
GLUCOSE BLDC GLUCOMTR-MCNC: 132 MG/DL — HIGH (ref 70–99)
GLUCOSE BLDC GLUCOMTR-MCNC: 256 MG/DL — HIGH (ref 70–99)
GLUCOSE BLDC GLUCOMTR-MCNC: 289 MG/DL — HIGH (ref 70–99)
GLUCOSE SERPL-MCNC: 123 MG/DL — HIGH (ref 70–99)
HCT VFR BLD CALC: 31.9 % — LOW (ref 34.5–45)
HGB BLD-MCNC: 10.4 G/DL — LOW (ref 11.5–15.5)
MAGNESIUM SERPL-MCNC: 1.7 MG/DL — SIGNIFICANT CHANGE UP (ref 1.6–2.6)
MCHC RBC-ENTMCNC: 28.9 PG — SIGNIFICANT CHANGE UP (ref 27–34)
MCHC RBC-ENTMCNC: 32.6 G/DL — SIGNIFICANT CHANGE UP (ref 32–36)
MCV RBC AUTO: 88.6 FL — SIGNIFICANT CHANGE UP (ref 80–100)
NRBC # BLD AUTO: 0 K/UL — SIGNIFICANT CHANGE UP (ref 0–0)
NRBC # FLD: 0 K/UL — SIGNIFICANT CHANGE UP (ref 0–0)
NRBC BLD AUTO-RTO: 0 /100 WBCS — SIGNIFICANT CHANGE UP (ref 0–0)
PHOSPHATE SERPL-MCNC: 2.7 MG/DL — SIGNIFICANT CHANGE UP (ref 2.4–4.7)
PLATELET # BLD AUTO: 293 K/UL — SIGNIFICANT CHANGE UP (ref 150–400)
PMV BLD: 9 FL — SIGNIFICANT CHANGE UP (ref 7–13)
POTASSIUM SERPL-MCNC: 3.6 MMOL/L — SIGNIFICANT CHANGE UP (ref 3.5–5.3)
POTASSIUM SERPL-SCNC: 3.6 MMOL/L — SIGNIFICANT CHANGE UP (ref 3.5–5.3)
RBC # BLD: 3.6 M/UL — LOW (ref 3.8–5.2)
RBC # FLD: 13.8 % — SIGNIFICANT CHANGE UP (ref 10.3–14.5)
SODIUM SERPL-SCNC: 139 MMOL/L — SIGNIFICANT CHANGE UP (ref 135–145)
SURGICAL PATHOLOGY STUDY: SIGNIFICANT CHANGE UP
WBC # BLD: 9.3 K/UL — SIGNIFICANT CHANGE UP (ref 3.8–10.5)
WBC # FLD AUTO: 9.3 K/UL — SIGNIFICANT CHANGE UP (ref 3.8–10.5)

## 2025-03-26 RX ORDER — ACETAMINOPHEN 500 MG/5ML
1000 LIQUID (ML) ORAL ONCE
Refills: 0 | Status: COMPLETED | OUTPATIENT
Start: 2025-03-26 | End: 2025-03-26

## 2025-03-26 RX ORDER — CYCLOBENZAPRINE HYDROCHLORIDE 15 MG/1
5 CAPSULE, EXTENDED RELEASE ORAL THREE TIMES A DAY
Refills: 0 | Status: DISCONTINUED | OUTPATIENT
Start: 2025-03-26 | End: 2025-03-27

## 2025-03-26 RX ORDER — GABAPENTIN 400 MG/1
100 CAPSULE ORAL EVERY 8 HOURS
Refills: 0 | Status: DISCONTINUED | OUTPATIENT
Start: 2025-03-26 | End: 2025-03-27

## 2025-03-26 RX ADMIN — Medication 50 MICROGRAM(S): at 03:32

## 2025-03-26 RX ADMIN — KETOROLAC TROMETHAMINE 15 MILLIGRAM(S): 30 INJECTION, SOLUTION INTRAMUSCULAR; INTRAVENOUS at 17:07

## 2025-03-26 RX ADMIN — KETOROLAC TROMETHAMINE 15 MILLIGRAM(S): 30 INJECTION, SOLUTION INTRAMUSCULAR; INTRAVENOUS at 12:31

## 2025-03-26 RX ADMIN — Medication 2 MILLIGRAM(S): at 07:05

## 2025-03-26 RX ADMIN — KETOROLAC TROMETHAMINE 15 MILLIGRAM(S): 30 INJECTION, SOLUTION INTRAMUSCULAR; INTRAVENOUS at 00:35

## 2025-03-26 RX ADMIN — HEPARIN SODIUM 5000 UNIT(S): 1000 INJECTION INTRAVENOUS; SUBCUTANEOUS at 21:20

## 2025-03-26 RX ADMIN — Medication 80 MILLIGRAM(S): at 16:42

## 2025-03-26 RX ADMIN — GABAPENTIN 100 MILLIGRAM(S): 400 CAPSULE ORAL at 21:20

## 2025-03-26 RX ADMIN — LISINOPRIL 5 MILLIGRAM(S): 5 TABLET ORAL at 05:11

## 2025-03-26 RX ADMIN — Medication 2 MILLIGRAM(S): at 00:21

## 2025-03-26 RX ADMIN — KETOROLAC TROMETHAMINE 15 MILLIGRAM(S): 30 INJECTION, SOLUTION INTRAMUSCULAR; INTRAVENOUS at 11:31

## 2025-03-26 RX ADMIN — Medication 40 MILLIEQUIVALENT(S): at 13:27

## 2025-03-26 RX ADMIN — HEPARIN SODIUM 5000 UNIT(S): 1000 INJECTION INTRAVENOUS; SUBCUTANEOUS at 05:11

## 2025-03-26 RX ADMIN — ROSUVASTATIN CALCIUM 20 MILLIGRAM(S): 5 TABLET, FILM COATED ORAL at 21:20

## 2025-03-26 RX ADMIN — KETOROLAC TROMETHAMINE 15 MILLIGRAM(S): 30 INJECTION, SOLUTION INTRAMUSCULAR; INTRAVENOUS at 23:08

## 2025-03-26 RX ADMIN — Medication 2 MILLIGRAM(S): at 21:15

## 2025-03-26 RX ADMIN — Medication 2 MILLIGRAM(S): at 20:17

## 2025-03-26 RX ADMIN — HEPARIN SODIUM 5000 UNIT(S): 1000 INJECTION INTRAVENOUS; SUBCUTANEOUS at 14:34

## 2025-03-26 RX ADMIN — KETOROLAC TROMETHAMINE 15 MILLIGRAM(S): 30 INJECTION, SOLUTION INTRAMUSCULAR; INTRAVENOUS at 05:10

## 2025-03-26 RX ADMIN — Medication 2 MILLIGRAM(S): at 06:05

## 2025-03-26 RX ADMIN — INSULIN LISPRO 1: 100 INJECTION, SOLUTION INTRAVENOUS; SUBCUTANEOUS at 21:19

## 2025-03-26 RX ADMIN — INSULIN LISPRO 3: 100 INJECTION, SOLUTION INTRAVENOUS; SUBCUTANEOUS at 17:07

## 2025-03-26 RX ADMIN — KETOROLAC TROMETHAMINE 15 MILLIGRAM(S): 30 INJECTION, SOLUTION INTRAMUSCULAR; INTRAVENOUS at 06:00

## 2025-03-26 NOTE — PROGRESS NOTE ADULT - ASSESSMENT
Assessment:   53yo Female s/p ostomy reversal. Pain control issues 2/2 allergies per patient. Will call pain management for help with pain control.     Plan:   - f/u pain management consult  - cont low fiber diet  - encourage oob and ambulation Assessment:   55yo Female s/p ostomy reversal. Pain control issues 2/2 allergies per patient. Will call pain management for help with pain control. Tachycardia likely 2/2 pain, therefore no objections from CRS standpoint to proceed with block in setting of tachycardia.    Plan:   - f/u pain management consult  - cont low fiber diet  - encourage oob and ambulation

## 2025-03-26 NOTE — PROCEDURE NOTE - ADDITIONAL PROCEDURE DETAILS
After sterile prep of abdomen and gloves were don, an ultrasound probe was used to visualize the transversus abdominis plane in between the internal oblique and transversus abdominis muscles. A 22G 80mm Pajunk needle was advanced in-plane and 5mg of Decadron+ 20cc of Bupivacaine 0.25% was administered into the TAP. Aspiration q5ccs was negative for heme/csf/air. Local anesthetic spread was visualized on ultrasound.  Procedure was repeated on contralateral side with additional  5mg of Decadron+ 20cc of Bupivacaine 0.25%.  Meaningful patient verbal response maintained throughout procedure.  Block needle tip identified by ultrasound throughout the procedure.  Patient tolerated procedure well.

## 2025-03-26 NOTE — CONSULT NOTE ADULT - SUBJECTIVE AND OBJECTIVE BOX
CC:    HPI: per chart review:  53yo Female s/p ostomy reversal. Pain control issues 2/2 allergies per patient. Will call pain management for help with pain control. Tachycardia likely 2/2 pain, therefore no objections from CRS standpoint to proceed with block in setting of tachycardia.    Interval Hx:  Patient seen during rounds  Patient reports pain to be mod controlled on current medications  Patient denies sedation with medications      Analgesic Dosing for past 24 hours reviewed as below:    ketorolac   Injectable   15 milliGRAM(s) IV Push (03-26-25 @ 11:31)   15 milliGRAM(s) IV Push (03-26-25 @ 05:10)   15 milliGRAM(s) IV Push (03-25-25 @ 23:21)   15 milliGRAM(s) IV Push (03-25-25 @ 17:15)    morphine  - Injectable   2 milliGRAM(s) IV Push (03-26-25 @ 06:05)   2 milliGRAM(s) IV Push (03-25-25 @ 23:21)   2 milliGRAM(s) IV Push (03-25-25 @ 17:15)          T(C): 37.3 (03-26-25 @ 08:24), Max: 37.4 (03-25-25 @ 23:37)  HR: 115 (03-26-25 @ 08:24) (112 - 118)  BP: 100/64 (03-26-25 @ 08:24) (100/64 - 119/71)  RR: 20 (03-26-25 @ 08:24) (18 - 20)  SpO2: 93% (03-26-25 @ 08:24) (93% - 95%)      03-25-25 @ 07:01  -  03-26-25 @ 07:00  --------------------------------------------------------  IN: 1290 mL / OUT: 350 mL / NET: 940 mL        cyclobenzaprine 5 milliGRAM(s) Oral three times a day PRN  dextrose 5%. 1000 milliLiter(s) IV Continuous <Continuous>  dextrose 5%. 1000 milliLiter(s) IV Continuous <Continuous>  dextrose 50% Injectable 25 Gram(s) IV Push once  dextrose 50% Injectable 12.5 Gram(s) IV Push once  dextrose 50% Injectable 25 Gram(s) IV Push once  dextrose Oral Gel 15 Gram(s) Oral once PRN  gabapentin 100 milliGRAM(s) Oral every 8 hours  glucagon  Injectable 1 milliGRAM(s) IntraMuscular once  heparin   Injectable 5000 Unit(s) SubCutaneous every 8 hours  insulin lispro (ADMELOG) corrective regimen sliding scale   SubCutaneous three times a day before meals  insulin lispro (ADMELOG) corrective regimen sliding scale   SubCutaneous at bedtime  ketorolac   Injectable 15 milliGRAM(s) IV Push every 6 hours  levothyroxine 50 MICROGram(s) Oral daily  lisinopril 5 milliGRAM(s) Oral daily  morphine  - Injectable 2 milliGRAM(s) IV Push every 6 hours PRN  rosuvastatin 20 milliGRAM(s) Oral at bedtime  simethicone 80 milliGRAM(s) Chew four times a day PRN                          10.4   9.30  )-----------( 293      ( 26 Mar 2025 04:36 )             31.9     03-26    139  |  104  |  6.7[L]  ----------------------------<  123[H]  3.6   |  21.0[L]  |  0.41[L]    Ca    8.1[L]      26 Mar 2025 04:36  Phos  2.7     03-26  Mg     1.7     03-26        Urinalysis Basic - ( 26 Mar 2025 04:36 )    Color: x / Appearance: x / SG: x / pH: x  Gluc: 123 mg/dL / Ketone: x  / Bili: x / Urobili: x   Blood: x / Protein: x / Nitrite: x   Leuk Esterase: x / RBC: x / WBC x   Sq Epi: x / Non Sq Epi: x / Bacteria: x        Pain Service   [phone]

## 2025-03-26 NOTE — CONSULT NOTE ADULT - ASSESSMENT
53yo Female s/p ostomy reversal. Pain control issues 2/2 allergies per patient.     Pain Medicine consulted for post-operative TAP block for continued post-op pain    Plan:  -consented for B/L Tap Block  -Patient tachy on initial exam, per CRS: okay to proceed with TAP block  -rest of care and dispo per primary CRS team    Discussed with Dr. Pretty    Patient interested in a nerve block procedure as an addition to their current analgesic therapy.   The patient has not experienced satisfactory relief from other treatment modalities including analgesic pain medications.  The risks, benefits and alternatives of a nerve block were discussed with the patient.  The benefits include hopeful relief of pain.  The alternatives include avoiding the procedure and continuing treatment with non-medication therapies and medications, including increasing and/or changing current analgesic medications.  The patient understands that this is an invasive procedure and therefore there are inherent risks. The patient was informed of the risks of the procedure including but not limited to infection, bleeding, injury to nearby tissues, permanent nerve injury, stroke, no decrease in pain or worsened pain, and toxicity from local anesthetic medications.   No certain guarantees have been made and patient understands that responses can vary and repeat procedures may be necessary.

## 2025-03-27 ENCOUNTER — NON-APPOINTMENT (OUTPATIENT)
Age: 55
End: 2025-03-27

## 2025-03-27 ENCOUNTER — TRANSCRIPTION ENCOUNTER (OUTPATIENT)
Age: 55
End: 2025-03-27

## 2025-03-27 VITALS
TEMPERATURE: 98 F | RESPIRATION RATE: 18 BRPM | OXYGEN SATURATION: 97 % | HEART RATE: 99 BPM | DIASTOLIC BLOOD PRESSURE: 89 MMHG | SYSTOLIC BLOOD PRESSURE: 157 MMHG

## 2025-03-27 LAB
GLUCOSE BLDC GLUCOMTR-MCNC: 152 MG/DL — HIGH (ref 70–99)
GLUCOSE BLDC GLUCOMTR-MCNC: 185 MG/DL — HIGH (ref 70–99)
GLUCOSE BLDC GLUCOMTR-MCNC: 251 MG/DL — HIGH (ref 70–99)

## 2025-03-27 PROCEDURE — 86900 BLOOD TYPING SEROLOGIC ABO: CPT

## 2025-03-27 PROCEDURE — 84100 ASSAY OF PHOSPHORUS: CPT

## 2025-03-27 PROCEDURE — 97163 PT EVAL HIGH COMPLEX 45 MIN: CPT

## 2025-03-27 PROCEDURE — C1889: CPT

## 2025-03-27 PROCEDURE — 88307 TISSUE EXAM BY PATHOLOGIST: CPT

## 2025-03-27 PROCEDURE — 85027 COMPLETE CBC AUTOMATED: CPT

## 2025-03-27 PROCEDURE — 88304 TISSUE EXAM BY PATHOLOGIST: CPT

## 2025-03-27 PROCEDURE — 85730 THROMBOPLASTIN TIME PARTIAL: CPT

## 2025-03-27 PROCEDURE — 86850 RBC ANTIBODY SCREEN: CPT

## 2025-03-27 PROCEDURE — S2900: CPT

## 2025-03-27 PROCEDURE — 80048 BASIC METABOLIC PNL TOTAL CA: CPT

## 2025-03-27 PROCEDURE — 82962 GLUCOSE BLOOD TEST: CPT

## 2025-03-27 PROCEDURE — 83735 ASSAY OF MAGNESIUM: CPT

## 2025-03-27 PROCEDURE — C9399: CPT

## 2025-03-27 PROCEDURE — 86901 BLOOD TYPING SEROLOGIC RH(D): CPT

## 2025-03-27 PROCEDURE — 85610 PROTHROMBIN TIME: CPT

## 2025-03-27 PROCEDURE — 36415 COLL VENOUS BLD VENIPUNCTURE: CPT

## 2025-03-27 PROCEDURE — T1013: CPT

## 2025-03-27 RX ORDER — TRAMADOL HYDROCHLORIDE 50 MG/1
50 TABLET, FILM COATED ORAL ONCE
Refills: 0 | Status: DISCONTINUED | OUTPATIENT
Start: 2025-03-27 | End: 2025-03-27

## 2025-03-27 RX ORDER — IBUPROFEN 200 MG
1 TABLET ORAL
Qty: 9 | Refills: 0
Start: 2025-03-27 | End: 2025-03-29

## 2025-03-27 RX ORDER — TRAMADOL HYDROCHLORIDE 50 MG/1
1 TABLET, FILM COATED ORAL
Qty: 9 | Refills: 0
Start: 2025-03-27 | End: 2025-03-29

## 2025-03-27 RX ORDER — LEVOTHYROXINE SODIUM 300 MCG
1 TABLET ORAL
Qty: 0 | Refills: 0 | DISCHARGE

## 2025-03-27 RX ADMIN — INSULIN LISPRO 3: 100 INJECTION, SOLUTION INTRAVENOUS; SUBCUTANEOUS at 12:29

## 2025-03-27 RX ADMIN — INSULIN LISPRO 1: 100 INJECTION, SOLUTION INTRAVENOUS; SUBCUTANEOUS at 16:08

## 2025-03-27 RX ADMIN — KETOROLAC TROMETHAMINE 15 MILLIGRAM(S): 30 INJECTION, SOLUTION INTRAMUSCULAR; INTRAVENOUS at 05:12

## 2025-03-27 RX ADMIN — KETOROLAC TROMETHAMINE 15 MILLIGRAM(S): 30 INJECTION, SOLUTION INTRAMUSCULAR; INTRAVENOUS at 00:06

## 2025-03-27 RX ADMIN — KETOROLAC TROMETHAMINE 15 MILLIGRAM(S): 30 INJECTION, SOLUTION INTRAMUSCULAR; INTRAVENOUS at 13:23

## 2025-03-27 RX ADMIN — INSULIN LISPRO 1: 100 INJECTION, SOLUTION INTRAVENOUS; SUBCUTANEOUS at 08:34

## 2025-03-27 RX ADMIN — KETOROLAC TROMETHAMINE 15 MILLIGRAM(S): 30 INJECTION, SOLUTION INTRAMUSCULAR; INTRAVENOUS at 06:15

## 2025-03-27 RX ADMIN — HEPARIN SODIUM 5000 UNIT(S): 1000 INJECTION INTRAVENOUS; SUBCUTANEOUS at 05:12

## 2025-03-27 RX ADMIN — Medication 50 MICROGRAM(S): at 05:12

## 2025-03-27 RX ADMIN — KETOROLAC TROMETHAMINE 15 MILLIGRAM(S): 30 INJECTION, SOLUTION INTRAMUSCULAR; INTRAVENOUS at 18:07

## 2025-03-27 RX ADMIN — KETOROLAC TROMETHAMINE 15 MILLIGRAM(S): 30 INJECTION, SOLUTION INTRAMUSCULAR; INTRAVENOUS at 12:23

## 2025-03-27 RX ADMIN — LISINOPRIL 5 MILLIGRAM(S): 5 TABLET ORAL at 05:18

## 2025-03-27 RX ADMIN — GABAPENTIN 100 MILLIGRAM(S): 400 CAPSULE ORAL at 05:13

## 2025-03-27 RX ADMIN — HEPARIN SODIUM 5000 UNIT(S): 1000 INJECTION INTRAVENOUS; SUBCUTANEOUS at 13:38

## 2025-03-27 RX ADMIN — GABAPENTIN 100 MILLIGRAM(S): 400 CAPSULE ORAL at 13:38

## 2025-03-27 RX ADMIN — TRAMADOL HYDROCHLORIDE 50 MILLIGRAM(S): 50 TABLET, FILM COATED ORAL at 16:37

## 2025-03-27 RX ADMIN — Medication 2 MILLIGRAM(S): at 09:35

## 2025-03-27 RX ADMIN — Medication 2 MILLIGRAM(S): at 08:35

## 2025-03-27 RX ADMIN — TRAMADOL HYDROCHLORIDE 50 MILLIGRAM(S): 50 TABLET, FILM COATED ORAL at 15:37

## 2025-03-27 NOTE — DISCHARGE NOTE NURSING/CASE MANAGEMENT/SOCIAL WORK - FINANCIAL ASSISTANCE
[facility] provides services at a reduced cost to those who are determined to be eligible through [facility]’s financial assistance program. Information regarding [facility]’s financial assistance program can be found by going to [email or web address] or by calling [phone].

## 2025-03-27 NOTE — DISCHARGE NOTE NURSING/CASE MANAGEMENT/SOCIAL WORK - PATIENT PORTAL LINK FT
You can access the FollowMyHealth Patient Portal offered by [facility] by registering at the following website: [email or web address]. By joining [facility]’s FollowMyHealth portal, you will also be able to view your health information using other applications (apps) compatible with our system.

## 2025-03-27 NOTE — DISCHARGE NOTE PROVIDER - NSDCMRMEDTOKEN_GEN_ALL_CORE_FT
glimepiride 4 mg oral tablet: 1 tab(s) orally 2 times a day  ibuprofen 800 mg oral tablet: 1 tab(s) orally every 8 hours MDD: 3  levothyroxine 50 mcg (0.05 mg) oral tablet: 1 tab(s) orally once a day  lisinopril 5 mg oral tablet: 1 orally once a day  metFORMIN 1000 mg oral tablet, extended release: 1 orally 2 times a day  Ozempic 2 mg/3 mL (0.25 mg or 0.5 mg dose) subcutaneous solution: 0.5 milligram(s) subcutaneously fridays  rosuvastatin 20 mg oral capsule: 1 cap(s) orally once a day (at bedtime)  traMADol 50 mg oral tablet: 1 tab(s) orally every 8 hours as needed for For mod pain Please take with food MDD: 3  Xigduo XR 5 mg-1000 mg oral tablet, extended release: 1 tab(s) orally 2 times a day

## 2025-03-27 NOTE — PROGRESS NOTE ADULT - ASSESSMENT
Assessment: 54 yof post op day 3 from a RA ostomy reversal, s/p b/l TAP blocks yesterday doing well.    Plan:   DVT Ppx  Encourage OOB  D/c planning for tomorrow

## 2025-03-27 NOTE — DISCHARGE NOTE PROVIDER - CARE PROVIDER_API CALL
Anne Abernathy  Colon/Rectal Surgery  [address] [place] [address]  Phone: [phone]  Fax: [phone]  Follow Up Time: 2 weeks

## 2025-03-27 NOTE — DISCHARGE NOTE PROVIDER - HOSPITAL COURSE
Arthur Avery is a 53 y/o female with as PMH of type 2 DM, HLD, GERD, arthritis, diverticulitis and HTN, s/p colostomy placement, hypothyroidism came to [facility] on 3/24/25 for a robotic laparoscopic colostomy reversal with Dr. Abernathy. The pt previously underwent a laparoscopic Zacarias's procedure for perforated diverticulitis on 1/5/2023 with Dr. Sharita Khan at [facility], however [facility] no longer honors her insurance. The pt tolerated the procedure well and recovered in PACU before being transferred [facility] to the inpatient floor. On admission day 1, the pt was seen by physical therapy and was cleared to return home. The rest of the hospital admission was uneventful. The pt was tolerating a regular diet and ambulating at baseline and was cleared to be discharged home with follow up outpatient in the clinic. Arthur Avery is a 53 y/o female with as PMH of type 2 DM, HLD, GERD, arthritis, diverticulitis and HTN, s/p colostomy placement, hypothyroidism came to [facility] on 3/24/25 for a robotic laparoscopic colostomy reversal with Dr. Abernathy. The pt previously underwent a laparoscopic Zacarias's procedure for perforated diverticulitis on 1/5/2023 with Dr. Sharita Khan at [facility], however [facility] no longer honors her insurance. The pt tolerated the procedure well and recovered in PACU before being transferred [facility] to the inpatient floor. On admission day 1, the pt was seen by physical therapy and was cleared to return home. On POD 2 she recieved a TAP block for abdominal pain given her allergic reaction to multiple pain medications.  The rest of the hospital admission was uneventful. The pt was tolerating a regular diet, having bowel function, and ambulating at baseline.  She was cleared to be discharged home with follow up outpatient in the clinic.

## 2025-03-27 NOTE — PROGRESS NOTE ADULT - ATTENDING COMMENTS
Patient seen and examined. She is POD 3 from robotic colostomy reversal. She is tolerating diet and pain is better controlled. A bit hesitant about discharging but no specific concerns when probed. No fevers or chills. Uses Ibuprofen at home and unsure if she has tried tramadol in the past for pain control. On exam, she is in no distress. Abdomen is soft, non distended and appropriately tender. Incisions are clean and intact and prior colostomy site is covered with dressing. HR is down trending. No routine blood work today. Discharge instructions reviewed and anticipate discharge home today
Patient seen and examined, chart reviewed.  Mild tachycardia overnight, but afebrile. No flatus or BM yet; has not yet ambulated due to Rob[place]o.  Complaining of pain - given Toradol without allergic reaction.  Abdomen soft, appropriately tender along incisions, no rebound/guarding.  On room air, nonlabored respirations, no edema to extremities appreciated.  UOP 800cc, clear yellow into Rob[place]o.  Labs reviewed - mild hypokalemia and hypomagnesemia; Hgb relatively stable; mild likely reactic leukocytosis.    -- Full liquid diet - monitor tolerance, possible advancement this evening if tolerates/bowel function noted.  -- IS, ambulation  -- Continue IVF given tachycardia  -- Multimodal pain control  -- Remove robledo - void trial  -- DVT prophylaxis  -- Awaiting return of bowel function.
patient seen and examined. POD 2 from robotic colostomy reversal. Doing well but has been persistently tachycardic to 110s. Reports improved pain control following repeat TAP block. Has had some bowel function. Tolerating diet well. Abdomen is soft, non distended and appropriately tender. Incisions clean and intact. Hgb 10.4 today from 11, will monitor. Keep today to follow tachycardia

## 2025-03-27 NOTE — DISCHARGE NOTE PROVIDER - NSDCCPTREATMENT_GEN_ALL_CORE_FT
PRINCIPAL PROCEDURE  Procedure: Reversal of colostomy or ileostomy  Findings and Treatment: BATHING: Please do not submerge wound underwater. You may shower and/or sponge bathe.   ACTIVITY: No heavy lifting or straining. Otherwise, you may return to your usual level of physical activity  DIET: Return to low fiber diet   NOTIFY YOUR SURGEON IF: You have any bleeding that does not stop, any pus draining from your wound(s), any fever (over 100.4 F) or chills, persistent nausea/vomiting, persistent diarrhea, or if your pain is not controlled on your discharge pain medications.  FOLLOW-UP: Please follow up with your primary care physician and [facility]

## 2025-03-27 NOTE — PROGRESS NOTE ADULT - SUBJECTIVE AND OBJECTIVE BOX
Subjective: Patient seen and examined at bedside, complaint of some abdominal soreness, pain regiment limited due to allergies but patient had no reaction to toradol so that is a viable plan, no overnight events, denies SOB/CP/N/V. Patient tolerating full liquid diet, ambulating independently, no BM yet      MEDICATIONS  (STANDING):  dextrose 5%. 1000 milliLiter(s) (50 mL/Hr) IV Continuous <Continuous>  dextrose 5%. 1000 milliLiter(s) (100 mL/Hr) IV Continuous <Continuous>  dextrose 50% Injectable 25 Gram(s) IV Push once  dextrose 50% Injectable 12.5 Gram(s) IV Push once  dextrose 50% Injectable 25 Gram(s) IV Push once  glucagon  Injectable 1 milliGRAM(s) IntraMuscular once  heparin   Injectable 5000 Unit(s) SubCutaneous every 8 hours  insulin lispro (ADMELOG) corrective regimen sliding scale   SubCutaneous three times a day before meals  insulin lispro (ADMELOG) corrective regimen sliding scale   SubCutaneous at bedtime  levothyroxine 50 MICROGram(s) Oral daily  lisinopril 5 milliGRAM(s) Oral daily  rosuvastatin 20 milliGRAM(s) Oral at bedtime    MEDICATIONS  (PRN):  dextrose Oral Gel 15 Gram(s) Oral once PRN Blood Glucose LESS THAN 70 milliGRAM(s)/deciliter  morphine  - Injectable 2 milliGRAM(s) IV Push every 6 hours PRN Moderate Pain (4 - 6)      Vital Signs Last 24 Hrs  T(C): 36.6 (25 Mar 2025 04:31), Max: 36.9 (24 Mar 2025 21:40)  T(F): 97.9 (25 Mar 2025 04:31), Max: 98.5 (24 Mar 2025 21:40)  HR: 114 (25 Mar 2025 04:54) (64 - 115)  BP: 114/74 (25 Mar 2025 04:54) (98/83 - 120/77)  BP(mean): 86 (24 Mar 2025 23:30) (84 - 91)  RR: 17 (25 Mar 2025 04:54) (12 - 18)  SpO2: 93% (25 Mar 2025 04:54) (93% - 99%)    Parameters below as of 25 Mar 2025 04:31  Patient On (Oxygen Delivery Method): room air        Physical Exam:    Constitutional: NAD  HEENT: PERRL, EOMI  Respiratory: Respirations non-labored, no accessory muscle use  Gastrointestinal: Soft, appropriately tender, non-distended, incisions c/d/i with dressings in place, packing in place at old ostomy site, top dressing changed  Neurological: A&O x 3      LABS:          PT/INR - ( 24 Mar 2025 15:49 )   PT: 13.6 sec;   INR: 1.21 ratio         PTT - ( 24 Mar 2025 15:49 )  PTT:39.1 sec      A: 54F POD1 s/p A colostomy reversal, tolerating FLD, overall doing well    P:  Pain control  Advance diet as tolerated  OOB as tolerated  Encourage IS  DVT PPx  AM labs/ replete lytes as needed  Ostomy packing out tomorrow 3/26  d/c [place]  
Subjective: Patient seen and examined at bedside, no acute complaints, no events overnight, tolerating a low fiber diet, ambulating, voiding, and passing flatus and BMs. She had a tap block yesterday, pain controlled.     MEDICATIONS  (STANDING):  dextrose 5%. 1000 milliLiter(s) (100 mL/Hr) IV Continuous <Continuous>  dextrose 5%. 1000 milliLiter(s) (50 mL/Hr) IV Continuous <Continuous>  dextrose 50% Injectable 25 Gram(s) IV Push once  dextrose 50% Injectable 12.5 Gram(s) IV Push once  dextrose 50% Injectable 25 Gram(s) IV Push once  gabapentin 100 milliGRAM(s) Oral every 8 hours  glucagon  Injectable 1 milliGRAM(s) IntraMuscular once  heparin   Injectable 5000 Unit(s) SubCutaneous every 8 hours  insulin lispro (ADMELOG) corrective regimen sliding scale   SubCutaneous three times a day before meals  insulin lispro (ADMELOG) corrective regimen sliding scale   SubCutaneous at bedtime  ketorolac   Injectable 15 milliGRAM(s) IV Push every 6 hours  levothyroxine 50 MICROGram(s) Oral daily  lisinopril 5 milliGRAM(s) Oral daily  rosuvastatin 20 milliGRAM(s) Oral at bedtime    MEDICATIONS  (PRN):  cyclobenzaprine 5 milliGRAM(s) Oral three times a day PRN Muscle Spasm  dextrose Oral Gel 15 Gram(s) Oral once PRN Blood Glucose LESS THAN 70 milliGRAM(s)/deciliter  morphine  - Injectable 2 milliGRAM(s) IV Push every 6 hours PRN Moderate Pain (4 - 6)  simethicone 80 milliGRAM(s) Chew four times a day PRN Gas    Vital Signs Last 24 Hrs  T(C): 36.5 (27 Mar 2025 04:18), Max: 37.3 (26 Mar 2025 08:24)  T(F): 97.7 (27 Mar 2025 04:18), Max: 99.1 (26 Mar 2025 08:24)  HR: 91 (27 Mar 2025 04:18) (91 - 117)  BP: 113/71 (27 Mar 2025 04:18) (100/64 - 122/79)  RR: 19 (27 Mar 2025 04:18) (17 - 20)  SpO2: 97% (27 Mar 2025 04:18) (93% - 97%)  Parameters below as of 27 Mar 2025 04:18  Patient On (Oxygen Delivery Method): room air    Physical Exam:  Constitutional: NAD  HEENT: PERRL, EOMI  Respiratory: Respirations non-labored, no accessory muscle use  Gastrointestinal: Soft, non-tender, non-distended, incisions c/d/i, some   Neurological: A&O x 3; without gross deficit    LABS:                     10.4   9.30  )-----------( 293      ( 26 Mar 2025 04:36 )             31.9   03-26  139  |  104  |  6.7[L]  ----------------------------<  123[H]  3.6   |  21.0[L]  |  0.41[L]  Ca    8.1[L]      26 Mar 2025 04:36  Phos  2.7     03-26  Mg     1.7     03-26    A: Patient is a 55 yo F s/p ostomy reversal, had a tap block yesterday for better pain control, last HR 91 from 110, improving, having bowel function.     Plan:   trend labs   low fiber diet   pain control   encourage ambulation oob and IS   
COLORECTAL SURGERY PROGRESS NOTE    Subjective:   Patient examined at bedside this AM. Reports she has pain in her neck, back, and abdomen. At home she does not take anything for pain 2/2 her allergies. She also admits she is able to tolerate ibuprofen. She tolerated liquids yesterday but had minimal solids. No acute events overnight    Objective:  Vital Signs  T(C): 37.3 (03-26 @ 08:24), Max: 37.4 (03-25 @ 23:37)  HR: 115 (03-26 @ 08:24) (112 - 118)  BP: 100/64 (03-26 @ 08:24) (100/64 - 119/71)  RR: 20 (03-26 @ 08:24) (18 - 20)  SpO2: 93% (03-26 @ 08:24) (93% - 95%)      03-25-25 @ 07:01  -  03-26-25 @ 07:00  --------------------------------------------------------  IN:  Total IN: 0 mL    OUT:    Voided (mL): 350 mL  Total OUT: 350 mL    Total NET: -350 mL      Physical Exam:  General: alert and oriented, NAD  Resp: airway patent, respirations unlabored  CVS: regular rate and rhythm  Abdomen: soft, nondistended, tender to palpation, no rebound or guarding  Extremities: no edema  Skin: warm, dry, appropriate color      Labs:                        10.4   9.30  )-----------( 293      ( 26 Mar 2025 04:36 )             31.9   03-26    139  |  104  |  6.7[L]  ----------------------------<  123[H]  3.6   |  21.0[L]  |  0.41[L]    Ca    8.1[L]      26 Mar 2025 04:36  Phos  2.7     03-26  Mg     1.7     03-26      CAPILLARY BLOOD GLUCOSE  POCT Blood Glucose.: 110 mg/dL (26 Mar 2025 07:52)  POCT Blood Glucose.: 136 mg/dL (25 Mar 2025 21:10)  POCT Blood Glucose.: 94 mg/dL (25 Mar 2025 16:47)
Interval HPI/ Overnight  Pt is feeling better s/p TAP block yesterday, but still doesn't feel she is ready to go home today. She is tolerating LFD. She is passing gas and having BM. Denies N/V/D/C. No acute events overnight.    MEDICATIONS  (STANDING):  dextrose 5%. 1000 milliLiter(s) (50 mL/Hr) IV Continuous <Continuous>  dextrose 5%. 1000 milliLiter(s) (100 mL/Hr) IV Continuous <Continuous>  dextrose 50% Injectable 25 Gram(s) IV Push once  dextrose 50% Injectable 12.5 Gram(s) IV Push once  dextrose 50% Injectable 25 Gram(s) IV Push once  gabapentin 100 milliGRAM(s) Oral every 8 hours  glucagon  Injectable 1 milliGRAM(s) IntraMuscular once  heparin   Injectable 5000 Unit(s) SubCutaneous every 8 hours  insulin lispro (ADMELOG) corrective regimen sliding scale   SubCutaneous three times a day before meals  insulin lispro (ADMELOG) corrective regimen sliding scale   SubCutaneous at bedtime  ketorolac   Injectable 15 milliGRAM(s) IV Push every 6 hours  levothyroxine 50 MICROGram(s) Oral daily  lisinopril 5 milliGRAM(s) Oral daily  rosuvastatin 20 milliGRAM(s) Oral at bedtime      MEDICATIONS  (PRN):  cyclobenzaprine 5 milliGRAM(s) Oral three times a day PRN Muscle Spasm  dextrose Oral Gel 15 Gram(s) Oral once PRN Blood Glucose LESS THAN 70 milliGRAM(s)/deciliter  morphine  - Injectable 2 milliGRAM(s) IV Push every 6 hours PRN Moderate Pain (4 - 6)  simethicone 80 milliGRAM(s) Chew four times a day PRN Gas      VITAL SIGNS  Vital Signs Last 24 Hrs  T(C): 36.5 (27 Mar 2025 04:18), Max: 37.3 (26 Mar 2025 08:24)  T(F): 97.7 (27 Mar 2025 04:18), Max: 99.1 (26 Mar 2025 08:24)  HR: 91 (27 Mar 2025 04:18) (91 - 117)  BP: 113/71 (27 Mar 2025 04:18) (100/64 - 122/79)  BP(mean): --  RR: 19 (27 Mar 2025 04:18) (17 - 20)  SpO2: 97% (27 Mar 2025 04:18) (93% - 97%)    Parameters below as of 27 Mar 2025 04:18  Patient On (Oxygen Delivery Method): room air        PHYSICAL EXAM    CONSTITUTIONAL: No apparent distress  ENMT: Oral mucosa with moist membranes.  RESP: Non-labored breathing.  GI: Soft, NT, ND. Dressing with serosanguinous fluid.      LABS                        10.4   9.30  )-----------( 293      ( 26 Mar 2025 04:36 )             31.9     03-26    139  |  104  |  6.7[L]  ----------------------------<  123[H]  3.6   |  21.0[L]  |  0.41[L]    Ca    8.1[L]      26 Mar 2025 04:36  Phos  2.7     03-26  Mg     1.7     03-26        Urinalysis Basic - ( 26 Mar 2025 04:36 )    Color: x / Appearance: x / SG: x / pH: x  Gluc: 123 mg/dL / Ketone: x  / Bili: x / Urobili: x   Blood: x / Protein: x / Nitrite: x   Leuk Esterase: x / RBC: x / WBC x   Sq Epi: x / Non Sq Epi: x / Bacteria: x

## 2025-04-01 PROBLEM — M19.90 UNSPECIFIED OSTEOARTHRITIS, UNSPECIFIED SITE: Chronic | Status: ACTIVE | Noted: 2025-03-08

## 2025-04-01 RX ORDER — TRAMADOL HYDROCHLORIDE 50 MG/1
50 TABLET, COATED ORAL 3 TIMES DAILY
Qty: 20 | Refills: 0 | Status: ACTIVE | COMMUNITY
Start: 2025-04-01 | End: 1900-01-01

## 2025-04-09 ENCOUNTER — NON-APPOINTMENT (OUTPATIENT)
Age: 55
End: 2025-04-09

## 2025-04-09 ENCOUNTER — APPOINTMENT (OUTPATIENT)
Dept: COLORECTAL SURGERY | Facility: CLINIC | Age: 55
End: 2025-04-09
Payer: COMMERCIAL

## 2025-04-09 VITALS — WEIGHT: 120 LBS | BODY MASS INDEX: 23.56 KG/M2 | HEIGHT: 60 IN

## 2025-04-09 DIAGNOSIS — R14.0 ABDOMINAL DISTENSION (GASEOUS): ICD-10-CM

## 2025-04-09 DIAGNOSIS — Z09 ENCOUNTER FOR FOLLOW-UP EXAMINATION AFTER COMPLETED TREATMENT FOR CONDITIONS OTHER THAN MALIGNANT NEOPLASM: ICD-10-CM

## 2025-04-09 DIAGNOSIS — R30.0 DYSURIA: ICD-10-CM

## 2025-04-09 PROCEDURE — 17250 CHEM CAUT OF GRANLTJ TISSUE: CPT | Mod: 58

## 2025-04-09 PROCEDURE — 99024 POSTOP FOLLOW-UP VISIT: CPT

## 2025-04-09 RX ORDER — SIMETHICONE 80 MG/1
80 TABLET, CHEWABLE ORAL
Qty: 60 | Refills: 0 | Status: ACTIVE | COMMUNITY
Start: 2025-04-09 | End: 1900-01-01

## 2025-04-10 LAB
APPEARANCE: CLEAR
BILIRUBIN URINE: NEGATIVE
BLOOD URINE: NEGATIVE
COLOR: YELLOW
GLUCOSE QUALITATIVE U: >=1000 MG/DL
KETONES URINE: NEGATIVE MG/DL
LEUKOCYTE ESTERASE URINE: NEGATIVE
NITRITE URINE: NEGATIVE
PH URINE: 5.5
PROTEIN URINE: NEGATIVE MG/DL
SPECIFIC GRAVITY URINE: >1.03
UROBILINOGEN URINE: 0.2 MG/DL

## 2025-04-16 ENCOUNTER — NON-APPOINTMENT (OUTPATIENT)
Age: 55
End: 2025-04-16